# Patient Record
Sex: FEMALE | Race: WHITE | NOT HISPANIC OR LATINO | Employment: UNEMPLOYED | RURAL
[De-identification: names, ages, dates, MRNs, and addresses within clinical notes are randomized per-mention and may not be internally consistent; named-entity substitution may affect disease eponyms.]

---

## 2019-03-18 ENCOUNTER — HISTORICAL (OUTPATIENT)
Dept: ADMINISTRATIVE | Facility: HOSPITAL | Age: 64
End: 2019-03-18

## 2019-03-20 LAB
LAB AP GENERAL CAT - HISTORICAL: NORMAL
LAB AP INTERPRETATION/RESULT - HISTORICAL: NEGATIVE
LAB AP SPECIMEN ADEQUACY - HISTORICAL: NORMAL
LAB AP SPECIMEN SUBMITTED - HISTORICAL: NORMAL

## 2020-08-06 ENCOUNTER — HISTORICAL (OUTPATIENT)
Dept: ADMINISTRATIVE | Facility: HOSPITAL | Age: 65
End: 2020-08-06

## 2020-08-24 ENCOUNTER — HISTORICAL (OUTPATIENT)
Dept: ADMINISTRATIVE | Facility: HOSPITAL | Age: 65
End: 2020-08-24

## 2021-01-25 ENCOUNTER — HISTORICAL (OUTPATIENT)
Dept: ADMINISTRATIVE | Facility: HOSPITAL | Age: 66
End: 2021-01-25

## 2021-01-25 LAB
ALBUMIN SERPL BCP-MCNC: 3.9 G/DL (ref 3.5–5)
ALBUMIN/GLOB SERPL: 1 {RATIO}
ALP SERPL-CCNC: 88 U/L (ref 55–142)
ALT SERPL W P-5'-P-CCNC: 122 U/L (ref 13–56)
ANION GAP SERPL CALCULATED.3IONS-SCNC: 15 MMOL/L
AST SERPL W P-5'-P-CCNC: 50 U/L (ref 15–37)
BASOPHILS # BLD AUTO: 0.05 X10E3/UL (ref 0–0.2)
BASOPHILS NFR BLD AUTO: 0.6 % (ref 0–1)
BILIRUB SERPL-MCNC: 0.1 MG/DL (ref 0–1.2)
BUN SERPL-MCNC: 21 MG/DL (ref 7–18)
BUN/CREAT SERPL: 27.3
CALCIUM SERPL-MCNC: 9.1 MG/DL (ref 8.5–10.1)
CHLORIDE SERPL-SCNC: 102 MMOL/L (ref 98–107)
CO2 SERPL-SCNC: 26 MMOL/L (ref 21–32)
CREAT SERPL-MCNC: 0.77 MG/DL (ref 0.55–1.02)
EOSINOPHIL # BLD AUTO: 0.3 X10E3/UL (ref 0–0.5)
EOSINOPHIL NFR BLD AUTO: 3.3 % (ref 1–4)
ERYTHROCYTE [DISTWIDTH] IN BLOOD BY AUTOMATED COUNT: 14 % (ref 11.5–14.5)
GLOBULIN SER-MCNC: 4.1 G/DL (ref 2–4)
GLUCOSE SERPL-MCNC: 102 MG/DL (ref 74–106)
HCT VFR BLD AUTO: 38.1 % (ref 38–47)
HGB BLD-MCNC: 12.7 G/DL (ref 12–16)
LYMPHOCYTES # BLD AUTO: 2.13 X10E3/UL (ref 1–4.8)
LYMPHOCYTES NFR BLD AUTO: 23.5 % (ref 27–41)
MAGNESIUM SERPL-MCNC: 2.1 MG/DL (ref 1.7–2.3)
MCH RBC QN AUTO: 28.5 PG (ref 27–31)
MCHC RBC AUTO-ENTMCNC: 33.3 G/DL (ref 32–36)
MCV RBC AUTO: 86 FL (ref 80–96)
MONOCYTES # BLD AUTO: 0.61 X10E3/UL (ref 0–0.8)
MONOCYTES NFR BLD AUTO: 6.7 % (ref 2–6)
MPC BLD CALC-MCNC: 10 FL (ref 9.4–12.4)
NEUTROPHILS # BLD AUTO: 5.98 X10E3/UL (ref 1.8–7.7)
NEUTROPHILS NFR BLD AUTO: 65.9 % (ref 53–65)
PLATELET # BLD AUTO: 301 X10E3/UL (ref 150–400)
POTASSIUM SERPL-SCNC: 4.2 MMOL/L (ref 3.5–5.1)
PROT SERPL-MCNC: 8 G/DL (ref 6.4–8.2)
RBC # BLD AUTO: 4.45 X10E6/UL (ref 4.2–5.4)
SODIUM SERPL-SCNC: 139 MMOL/L (ref 136–145)
TROPONIN I SERPL-MCNC: <0.017 NG/ML (ref 0–0.06)
WBC # BLD AUTO: 9.07 X10E3/UL (ref 4.5–11)

## 2021-05-24 LAB — CRC RECOMMENDATION EXT: NORMAL

## 2022-11-23 ENCOUNTER — HOSPITAL ENCOUNTER (EMERGENCY)
Facility: HOSPITAL | Age: 67
Discharge: HOME | End: 2022-11-23
Payer: MEDICARE

## 2022-11-23 ENCOUNTER — TELEPHONE (OUTPATIENT)
Dept: EMERGENCY MEDICINE | Facility: HOSPITAL | Age: 67
End: 2022-11-23
Payer: MEDICARE

## 2022-11-23 VITALS
SYSTOLIC BLOOD PRESSURE: 128 MMHG | RESPIRATION RATE: 18 BRPM | BODY MASS INDEX: 30.73 KG/M2 | HEIGHT: 64 IN | DIASTOLIC BLOOD PRESSURE: 70 MMHG | HEART RATE: 72 BPM | WEIGHT: 180 LBS | TEMPERATURE: 98 F | OXYGEN SATURATION: 97 %

## 2022-11-23 DIAGNOSIS — R07.89 CHEST WALL PAIN: Primary | ICD-10-CM

## 2022-11-23 DIAGNOSIS — R07.9 CHEST PAIN: ICD-10-CM

## 2022-11-23 LAB
ALBUMIN SERPL BCP-MCNC: 4 G/DL (ref 3.5–5)
ALBUMIN/GLOB SERPL: 1.1 {RATIO}
ALP SERPL-CCNC: 113 U/L (ref 55–142)
ALT SERPL W P-5'-P-CCNC: 63 U/L (ref 13–56)
ANION GAP SERPL CALCULATED.3IONS-SCNC: 11 MMOL/L (ref 7–16)
AST SERPL W P-5'-P-CCNC: 28 U/L (ref 15–37)
BASOPHILS # BLD AUTO: 0.02 K/UL (ref 0–0.2)
BASOPHILS NFR BLD AUTO: 0.2 % (ref 0–1)
BILIRUB SERPL-MCNC: 0.5 MG/DL (ref ?–1.2)
BUN SERPL-MCNC: 22 MG/DL (ref 7–18)
BUN/CREAT SERPL: 30 (ref 6–20)
CALCIUM SERPL-MCNC: 9.2 MG/DL (ref 8.5–10.1)
CHLORIDE SERPL-SCNC: 106 MMOL/L (ref 98–107)
CO2 SERPL-SCNC: 28 MMOL/L (ref 21–32)
CREAT SERPL-MCNC: 0.74 MG/DL (ref 0.55–1.02)
DIFFERENTIAL METHOD BLD: ABNORMAL
EGFR (NO RACE VARIABLE) (RUSH/TITUS): 88 ML/MIN/1.73M²
EOSINOPHIL # BLD AUTO: 0.45 K/UL (ref 0–0.5)
EOSINOPHIL NFR BLD AUTO: 3.8 % (ref 1–4)
ERYTHROCYTE [DISTWIDTH] IN BLOOD BY AUTOMATED COUNT: 13.1 % (ref 11.5–14.5)
GLOBULIN SER-MCNC: 3.8 G/DL (ref 2–4)
GLUCOSE SERPL-MCNC: 112 MG/DL (ref 74–106)
HCT VFR BLD AUTO: 39.3 % (ref 38–47)
HGB BLD-MCNC: 13 G/DL (ref 12–16)
LYMPHOCYTES # BLD AUTO: 3.6 K/UL (ref 1–4.8)
LYMPHOCYTES NFR BLD AUTO: 30.3 % (ref 27–41)
MAGNESIUM SERPL-MCNC: 2 MG/DL (ref 1.7–2.3)
MCH RBC QN AUTO: 28.1 PG (ref 27–31)
MCHC RBC AUTO-ENTMCNC: 33.1 G/DL (ref 32–36)
MCV RBC AUTO: 84.9 FL (ref 80–96)
MONOCYTES # BLD AUTO: 0.83 K/UL (ref 0–0.8)
MONOCYTES NFR BLD AUTO: 7 % (ref 2–6)
MPC BLD CALC-MCNC: 9.9 FL (ref 9.4–12.4)
NEUTROPHILS # BLD AUTO: 6.98 K/UL (ref 1.8–7.7)
NEUTROPHILS NFR BLD AUTO: 58.7 % (ref 53–65)
PLATELET # BLD AUTO: 303 K/UL (ref 150–400)
POTASSIUM SERPL-SCNC: 4.3 MMOL/L (ref 3.5–5.1)
PROT SERPL-MCNC: 7.8 G/DL (ref 6.4–8.2)
RBC # BLD AUTO: 4.63 M/UL (ref 4.2–5.4)
SODIUM SERPL-SCNC: 141 MMOL/L (ref 136–145)
TROPONIN I SERPL HS-MCNC: 5.7 PG/ML
TROPONIN I SERPL HS-MCNC: 6.8 PG/ML
WBC # BLD AUTO: 11.88 K/UL (ref 4.5–11)

## 2022-11-23 PROCEDURE — 93005 ELECTROCARDIOGRAM TRACING: CPT

## 2022-11-23 PROCEDURE — 85025 COMPLETE CBC W/AUTO DIFF WBC: CPT | Performed by: NURSE PRACTITIONER

## 2022-11-23 PROCEDURE — 84484 ASSAY OF TROPONIN QUANT: CPT | Performed by: NURSE PRACTITIONER

## 2022-11-23 PROCEDURE — 80053 COMPREHEN METABOLIC PANEL: CPT | Performed by: NURSE PRACTITIONER

## 2022-11-23 PROCEDURE — 99283 EMERGENCY DEPT VISIT LOW MDM: CPT | Mod: GC | Performed by: NURSE PRACTITIONER

## 2022-11-23 PROCEDURE — 93010 ELECTROCARDIOGRAM REPORT: CPT | Performed by: FAMILY MEDICINE

## 2022-11-23 PROCEDURE — 25000003 PHARM REV CODE 250: Performed by: NURSE PRACTITIONER

## 2022-11-23 PROCEDURE — 83735 ASSAY OF MAGNESIUM: CPT | Performed by: NURSE PRACTITIONER

## 2022-11-23 PROCEDURE — 25000242 PHARM REV CODE 250 ALT 637 W/ HCPCS: Performed by: NURSE PRACTITIONER

## 2022-11-23 PROCEDURE — 36415 COLL VENOUS BLD VENIPUNCTURE: CPT | Performed by: NURSE PRACTITIONER

## 2022-11-23 PROCEDURE — 99285 EMERGENCY DEPT VISIT HI MDM: CPT | Mod: 25

## 2022-11-23 RX ORDER — NITROGLYCERIN 0.4 MG/1
0.4 TABLET SUBLINGUAL
Status: COMPLETED | OUTPATIENT
Start: 2022-11-23 | End: 2022-11-23

## 2022-11-23 RX ORDER — TIZANIDINE 4 MG/1
4 TABLET ORAL EVERY 6 HOURS PRN
Qty: 30 TABLET | Refills: 0 | Status: SHIPPED | OUTPATIENT
Start: 2022-11-23 | End: 2022-12-23

## 2022-11-23 RX ORDER — ASPIRIN 325 MG
325 TABLET ORAL
Status: COMPLETED | OUTPATIENT
Start: 2022-11-23 | End: 2022-11-23

## 2022-11-23 RX ORDER — NAPROXEN 500 MG/1
500 TABLET ORAL 2 TIMES DAILY WITH MEALS
Qty: 60 TABLET | Refills: 0 | Status: SHIPPED | OUTPATIENT
Start: 2022-11-23 | End: 2023-02-16

## 2022-11-23 RX ORDER — ESCITALOPRAM OXALATE 10 MG/1
10 TABLET ORAL DAILY
COMMUNITY
End: 2023-10-11

## 2022-11-23 RX ADMIN — ASPIRIN 325 MG ORAL TABLET 325 MG: 325 PILL ORAL at 03:11

## 2022-11-23 RX ADMIN — NITROGLYCERIN 0.4 MG: 0.4 TABLET SUBLINGUAL at 03:11

## 2022-11-23 NOTE — ED PROVIDER NOTES
Encounter Date: 11/23/2022       History     Chief Complaint   Patient presents with    Chest Pain     C/o chest pain and neck pain for aprox 1 hour     Patient presents with left-sided chest pain about an hour prior to arrival.  Was awoken from sleep with pain she describes as tightness.  Also reports having right posterior neck pain times several days.  Neck pain is not worse with movement.  Chest pain is nonradiating and waxing and waning.  At the time of exam she had no pain, then throughout the exam she began to report the same pain described as a tightness.  Nonradiating.  Denies dyspnea, palpitations, diaphoresis, or nausea.  No history of similar.  No known history of cardiac disease and has never seen Cardiology.  Mother had stents in her 60s.  The patient also reports multiple life stressors including death of a family member and attributes her pain to anxiety.    Review of patient's allergies indicates:  No Known Allergies  History reviewed. No pertinent past medical history.  Past Surgical History:   Procedure Laterality Date    CHOLECYSTECTOMY      HERNIA REPAIR      HYSTERECTOMY       History reviewed. No pertinent family history.  Social History     Tobacco Use    Smoking status: Former     Types: Cigarettes    Smokeless tobacco: Never   Substance Use Topics    Alcohol use: Not Currently    Drug use: Not Currently     Review of Systems   Constitutional:  Negative for fever.   HENT:  Negative for trouble swallowing.    Respiratory:  Positive for cough. Negative for shortness of breath.    Cardiovascular:  Positive for chest pain. Negative for palpitations and leg swelling.   Gastrointestinal:  Negative for abdominal pain, diarrhea, nausea and vomiting.   Genitourinary:  Negative for decreased urine volume.   Skin:  Negative for color change.   Neurological:  Negative for dizziness, numbness and headaches.     Physical Exam     Initial Vitals [11/23/22 0248]   BP Pulse Resp Temp SpO2   (!) 167/69 84 18  97.7 °F (36.5 °C) 97 %      MAP       --         Physical Exam    Nursing note and vitals reviewed.  Constitutional: No distress.   HENT:   Head: Normocephalic and atraumatic.   Eyes: EOM are normal.   Neck: Neck supple.   Cardiovascular:  Normal rate, regular rhythm and normal heart sounds.           Pulmonary/Chest: Breath sounds normal. No respiratory distress. She exhibits no tenderness.   Musculoskeletal:         General: No edema.      Cervical back: Neck supple.     Neurological: She is alert. GCS score is 15. GCS eye subscore is 4. GCS verbal subscore is 5. GCS motor subscore is 6.   Skin: Skin is warm and dry. Capillary refill takes less than 2 seconds.       Medical Screening Exam   See Full Note    ED Course   Procedures  Labs Reviewed   COMPREHENSIVE METABOLIC PANEL - Abnormal; Notable for the following components:       Result Value    Glucose 112 (*)     BUN 22 (*)     BUN/Creatinine Ratio 30 (*)     ALT 63 (*)     All other components within normal limits   CBC WITH DIFFERENTIAL - Abnormal; Notable for the following components:    WBC 11.88 (*)     Monocytes % 7.0 (*)     Monocytes, Absolute 0.83 (*)     All other components within normal limits   MAGNESIUM - Normal   TROPONIN I - Normal   TROPONIN I - Normal   CBC W/ AUTO DIFFERENTIAL    Narrative:     The following orders were created for panel order CBC auto differential.  Procedure                               Abnormality         Status                     ---------                               -----------         ------                     CBC with Differential[[id number]]        Abnormal            Final result                 Please view results for these tests on the individual orders.        ECG Results              EKG 12-lead (In process)  Result time 11/23/22 03:13:35      In process by Interface, Lab In [facility] (11/23/22 03:13:35)                   Narrative:    Test Reason : R07.9,    Vent. Rate : 080 BPM     Atrial Rate : 080 BPM      P-R Int : 142 ms          QRS Dur : 078 ms      QT Int : 396 ms       P-R-T Axes : 000 010 033 degrees     QTc Int : 456 ms    Sinus rhythm with Premature atrial complexes  Possible Inferior infarct ,age undetermined  Abnormal ECG  No previous ECGs available    Referred By:  ROSE           Confirmed By:                                   Imaging Results              X-Ray Chest 1 View (Final result)  Result time 11/23/22 07:50:21      Final result by Pablo Nix DO (11/23/22 07:50:21)                   Impression:      Lower lobe airspace opacities, atelectasis versus pneumonia. Background of chronic lung change.      Electronically signed by: Pablo Nix  Date:    11/23/2022  Time:    07:50               Narrative:    EXAMINATION:  XR CHEST 1 VIEW    CLINICAL HISTORY:  Chest pain, unspecified    TECHNIQUE:  XR CHEST 1 VIEW    COMPARISON:  2021    FINDINGS:  No lines or tubes.    Lower lobe airspace opacities, atelectasis versus pneumonia. Background of chronic lung change.    Normal pleura.    Cardiac silhouette is similar to comparison exam.    No obvious acute bone findings.                                       Medications   aspirin tablet 325 mg (325 mg Oral Given 11/23/22 0304)   nitroGLYCERIN SL tablet 0.4 mg (0.4 mg Sublingual Given 11/23/22 0316)        Additional MDM:   Heart Score:    History:          Moderately suspicious.  ECG:             Nonspecific repolarisation disturbance  Age:               >65 years  Risk factors: 1-2 risk factors  Troponin:       Less than or equal to normal limit  Final Score: 5          ED Course as of 11/27/22 0619   Wed Nov 23, 2022   0310 Question of ST elevation in inferior leads, however elevation is associated with PACs and not consistent throughout the leads. Requested Dr Soriano to look at EKG for second interpretation [GM]   0330 Negative initial troponin. [facility] faxed [GM]   2529 Video consult with Dr Ceron. Plan to repeat troponin and consult  cardiology []   0600 Signed out to Dr Grant []      ED Course User Index  [] GILMA Castro          Clinical Impression:   Final diagnoses:  [R07.9] Chest pain  [R07.89] Chest wall pain (Primary)        ED Disposition Condition    Discharge Stable          ED Prescriptions       Medication Sig Dispense Start Date End Date Auth. Provider    naproxen (NAPROSYN) 500 MG tablet Take 1 tablet (500 mg total) by mouth 2 (two) times daily with meals. 60 tablet 11/23/2022 -- Todd Grant DO    tiZANidine (ZANAFLEX) 4 MG tablet Take 1 tablet (4 mg total) by mouth every 6 (six) hours as needed. 30 tablet 11/23/2022 12/23/2022 Todd Grant DO          Follow-up Information    None          GILMA Castro  11/27/22 0619

## 2023-01-19 ENCOUNTER — ATHLETIC TRAINING SESSION (OUTPATIENT)
Dept: SPORTS MEDICINE | Facility: CLINIC | Age: 68
End: 2023-01-19

## 2023-01-19 NOTE — PROGRESS NOTES
Subjective:          Chief Complaint: Pau Odell is a 67 y.o. female student at [employer] who had no chief complaint listed for this encounter.    HPI    ROS                Objective:        General: Pau is well-developed, well-nourished, appears stated age, in no acute distress, alert and oriented to time, place and person.               Right Knee Exam     Comments:  Referred to Dr. Jareth Pascal III    Left Knee Exam   Left knee exam is normal.            Assessment:                 Plan:         1. ARICE  2. Physician Referral: yes  3. ED Referral: no  4. Parent/Guardian Notified5. All questions were answered, ath. will contact me for questions or concerns in  the interim.  6.         Eligible to use School Insurance:

## 2023-01-25 ENCOUNTER — ATHLETIC TRAINING SESSION (OUTPATIENT)
Dept: SPORTS MEDICINE | Facility: CLINIC | Age: 68
End: 2023-01-25

## 2023-01-25 NOTE — PROGRESS NOTES
Subjective:          Chief Complaint: Pau Odell is a 67 y.o. female student at [employer] who had no chief complaint listed for this encounter.    R Knee Pain        ROS                Objective:        General: Pau is well-developed, well-nourished, appears stated age, in no acute distress, alert and oriented to time, place and person.               Right Knee Exam     Inspection   Swelling: present  Effusion: present    Comments:  R Knee Pain  Referred to Dr Jareth Pascal    Left Knee Exam   Left knee exam is normal.            Assessment:                 Plan:         1. RICE  2. Physician Referral: yes  3. ED Referral: . Parent/Guardian Notified:   5. All questions were answered, ath. will contact me for questions or concerns in  the interim.  6.         Eligible to use School Insurance:

## 2023-01-26 ENCOUNTER — OFFICE VISIT (OUTPATIENT)
Dept: ORTHOPEDICS | Facility: CLINIC | Age: 68
End: 2023-01-26
Payer: MEDICARE

## 2023-01-26 ENCOUNTER — HOSPITAL ENCOUNTER (OUTPATIENT)
Dept: RADIOLOGY | Facility: HOSPITAL | Age: 68
Discharge: HOME | End: 2023-01-26
Attending: ORTHOPAEDIC SURGERY
Payer: MEDICARE

## 2023-01-26 VITALS — WEIGHT: 185 LBS | HEIGHT: 66 IN | BODY MASS INDEX: 29.73 KG/M2

## 2023-01-26 DIAGNOSIS — M25.561 ACUTE PAIN OF RIGHT KNEE: ICD-10-CM

## 2023-01-26 DIAGNOSIS — M25.561 ACUTE PAIN OF RIGHT KNEE: Primary | ICD-10-CM

## 2023-01-26 PROCEDURE — 73564 X-RAY EXAM KNEE 4 OR MORE: CPT | Mod: 26,RT,, | Performed by: ORTHOPAEDIC SURGERY

## 2023-01-26 PROCEDURE — 99213 OFFICE O/P EST LOW 20 MIN: CPT | Mod: PBBFAC | Performed by: ORTHOPAEDIC SURGERY

## 2023-01-26 PROCEDURE — 99203 OFFICE O/P NEW LOW 30 MIN: CPT | Mod: S$PBB,,, | Performed by: ORTHOPAEDIC SURGERY

## 2023-01-26 PROCEDURE — 73564 XR KNEE COMP 4 OR MORE VIEWS RIGHT: ICD-10-PCS | Mod: 26,RT,, | Performed by: ORTHOPAEDIC SURGERY

## 2023-01-26 PROCEDURE — 99203 PR OFFICE/OUTPT VISIT, NEW, LEVL III, 30-44 MIN: ICD-10-PCS | Mod: S$PBB,,, | Performed by: ORTHOPAEDIC SURGERY

## 2023-01-26 PROCEDURE — 73564 X-RAY EXAM KNEE 4 OR MORE: CPT | Mod: TC,RT

## 2023-01-26 NOTE — PROGRESS NOTES
CC:   Chief Complaint   Patient presents with    Right Knee - Pain     WILL BALL        PREVIOUS INFO:        HISTORY:   1/26/2023    Pau Odell  is a 67 y.o. got clipped by 110 lb dog right knee about 2  and half weeks ago had pain some swelling but still having significant problems trying to put weight through her leg she is been seen by nurse practitioner was started on Naprosyn and a Voltaren gel      PAST MEDICAL HISTORY: History reviewed. No pertinent past medical history.       PAST SURGICAL HISTORY:   Past Surgical History:   Procedure Laterality Date    CHOLECYSTECTOMY      HERNIA REPAIR      HYSTERECTOMY            ALLERGIES: Review of patient's allergies indicates:  No Known Allergies     MEDICATIONS :    Current Outpatient Medications:     EScitalopram oxalate (LEXAPRO) 10 MG tablet, Take 10 mg by mouth once daily., Disp: , Rfl:     naproxen (NAPROSYN) 500 MG tablet, Take 1 tablet (500 mg total) by mouth 2 (two) times daily with meals., Disp: 60 tablet, Rfl: 0     SOCIAL HISTORY:   Social History     Socioeconomic History    Marital status:    Tobacco Use    Smoking status: Former     Types: Cigarettes    Smokeless tobacco: Never   Substance and Sexual Activity    Alcohol use: Not Currently    Drug use: Not Currently    Sexual activity: Yes        ROS    FAMILY HISTORY: History reviewed. No pertinent family history.       PHYSICAL EXAM: There were no vitals filed for this visit.            Body mass index is 29.86 kg/m².     In general, this is a well-developed, well-nourished female . The patient is alert, oriented and cooperative.      HEENT:  Normocephalic, atraumatic.  Extraocular movements are intact bilaterally.  The oropharynx is benign.       NECK:  Nontender with good range of motion.      PULMONARY: Respirations are even and non-labored.       CARDIOVASCULAR: Pulses regular by peripheral palpation.       ABDOMEN:  Soft, non-tender, non-distended.        EXTREMITIES:   Pleasant white female  Right knee 2+ dorsalis pedis pulse minimal effusion lacks maybe 3-5 degrees full extension flexion is about 120 she is not that tender to the touch medially but valgus stressing causes pain medially Ivette testing causes pain medially anterior-posterior drawer appear to be stable    Ortho Exam      RADIOGRAPHIC FINDINGS:  Right knee standing x-rays total four views AP lateral sunrise weight-bearing films early osteophytes seen medially and laterally mild to moderate DJD no fracture dislocations appreciated      .      IMPRESSION: Acute pain of right knee     Status post clipping injury by 110 lb dog about 2 and half weeks ago still has difficulty weight-bearing    PLAN:  MRI of right knee  Knee immobilizer  Prescription for a walker          No follow-ups on file.         Cali Pascal III      (Subject to voice recognition error, transcription service not allowed)

## 2023-02-16 ENCOUNTER — OFFICE VISIT (OUTPATIENT)
Dept: ORTHOPEDICS | Facility: CLINIC | Age: 68
End: 2023-02-16
Payer: MEDICARE

## 2023-02-16 DIAGNOSIS — M17.10 PRIMARY OSTEOARTHRITIS OF KNEE, UNSPECIFIED LATERALITY: Primary | ICD-10-CM

## 2023-02-16 PROCEDURE — 99213 PR OFFICE/OUTPT VISIT, EST, LEVL III, 20-29 MIN: ICD-10-PCS | Mod: S$PBB,,, | Performed by: ORTHOPAEDIC SURGERY

## 2023-02-16 PROCEDURE — 99213 OFFICE O/P EST LOW 20 MIN: CPT | Mod: S$PBB,,, | Performed by: ORTHOPAEDIC SURGERY

## 2023-02-16 PROCEDURE — 99212 OFFICE O/P EST SF 10 MIN: CPT | Mod: PBBFAC | Performed by: ORTHOPAEDIC SURGERY

## 2023-02-16 RX ORDER — MELOXICAM 7.5 MG/1
7.5 TABLET ORAL DAILY
Qty: 30 TABLET | Refills: 1 | Status: SHIPPED | OUTPATIENT
Start: 2023-02-16 | End: 2023-04-06 | Stop reason: ALTCHOICE

## 2023-02-16 NOTE — PROGRESS NOTES
CC:   Chief Complaint   Patient presents with    Follow-up     RECHECK RT KNEE AFTER MRI        PREVIOUS INFO:  HISTORY:   1/26/2023    Pau Odell  is a 67 y.o. got clipped by 110 lb dog right knee about 2  and half weeks ago had pain some swelling but still having significant problems trying to put weight through her leg she is been seen by nurse practitioner was started on Naprosyn and a Voltaren gel      HISTORY:   2/16/2023    Pau Odell  is a 67 y.o. she really has not been using the Voltaren gel or the Naprosyn her knee still bothers her some she is been in a knee immobilizer after the MRI I discussed the MRI with her at length today      PAST MEDICAL HISTORY: No past medical history on file.       PAST SURGICAL HISTORY:   Past Surgical History:   Procedure Laterality Date    CHOLECYSTECTOMY      HERNIA REPAIR      HYSTERECTOMY            ALLERGIES: Review of patient's allergies indicates:  No Known Allergies     MEDICATIONS :    Current Outpatient Medications:     EScitalopram oxalate (LEXAPRO) 10 MG tablet, Take 10 mg by mouth once daily., Disp: , Rfl:     naproxen (NAPROSYN) 500 MG tablet, Take 1 tablet (500 mg total) by mouth 2 (two) times daily with meals., Disp: 60 tablet, Rfl: 0     SOCIAL HISTORY:   Social History     Socioeconomic History    Marital status:    Tobacco Use    Smoking status: Former     Types: Cigarettes    Smokeless tobacco: Never   Substance and Sexual Activity    Alcohol use: Not Currently    Drug use: Not Currently    Sexual activity: Yes        ROS    FAMILY HISTORY: No family history on file.       PHYSICAL EXAM: There were no vitals filed for this visit.            There is no height or weight on file to calculate BMI.     In general, this is a well-developed, well-nourished female . The patient is alert, oriented and cooperative.      HEENT:  Normocephalic, atraumatic.  Extraocular movements are intact bilaterally.  The oropharynx is benign.        NECK:  Nontender with good range of motion.      PULMONARY: Respirations are even and non-labored.       CARDIOVASCULAR: Pulses regular by peripheral palpation.       ABDOMEN:  Soft, non-tender, non-distended.        EXTREMITIES:  Valgus stressing causes some pain but she does not open up Ivette testing causes some pain also with flexion this is her right knee    Ortho Exam        RADIOGRAPHIC FINDINGS:  Right knee January 16, 2023 standing x-rays total four views AP lateral sunrise weight-bearing films early osteophytes seen medially and laterally mild to moderate DJD no fracture dislocations appreciated       MRI Knee Without Contrast Right: Result Notes   Cali Pascal III, MD   1/26/2023  1:20 PM CST       Acute MCL injury  Use the knee immobilizer and walker   Come out for some gentle range of motion  Follow-up 2-3 weeks     Does show cartilage wear worse than seen on x-ray       2Impression:     There is moderate to high-grade tearing involving the distal aspect of the MCL.     There is some subtle irregularity along the free edge of the posterior horn of the medial meniscus which may reflect tearing.     High-grade cartilage loss within the medial compartment.  Moderate cartilage loss within the lateral compartment.  High-grade cartilage loss involving the lateral aspect of the patellofemoral compartment.  Mild-to-moderate tricompartmental marginal osteophyte formation.     Small knee joint effusion.  Small Baker's cyst.     Point of Service: [facility]        Electronically signed by: Jorge Alex  Date:                                            01/26/2023  Time:                           .      IMPRESSION:  Acute MCL injury with a flare-up of underlying DJD that is worse on MRI than x-rays    PLAN:  Currently on will advance her to a hinged knee sleeve prescription written and get her on a prescription anti-inflammatory Mobic has as a discussed with her that I think the only surgical  option here would be a total knee replacement which as of right now she is interested in check on her in 3-4 weeks if the Mobic bothers her stomach stop it immediately        No follow-ups on file.         Cali Pascal III      (Subject to voice recognition error, transcription service not allowed)

## 2023-03-16 ENCOUNTER — OFFICE VISIT (OUTPATIENT)
Dept: ORTHOPEDICS | Facility: CLINIC | Age: 68
End: 2023-03-16
Payer: MEDICARE

## 2023-03-16 DIAGNOSIS — Z09 FOLLOW-UP EXAMINATION, FOLLOWING OTHER SURGERY: Primary | ICD-10-CM

## 2023-03-16 PROCEDURE — 20610 PR DRAIN/INJECT LARGE JOINT/BURSA: ICD-10-PCS | Mod: S$PBB,RT,, | Performed by: ORTHOPAEDIC SURGERY

## 2023-03-16 PROCEDURE — 99212 OFFICE O/P EST SF 10 MIN: CPT | Mod: PBBFAC | Performed by: ORTHOPAEDIC SURGERY

## 2023-03-16 PROCEDURE — 20610 DRAIN/INJ JOINT/BURSA W/O US: CPT | Mod: S$PBB,RT,, | Performed by: ORTHOPAEDIC SURGERY

## 2023-03-16 PROCEDURE — 20610 DRAIN/INJ JOINT/BURSA W/O US: CPT | Mod: PBBFAC | Performed by: ORTHOPAEDIC SURGERY

## 2023-03-16 PROCEDURE — 99212 OFFICE O/P EST SF 10 MIN: CPT | Mod: S$PBB,25,, | Performed by: ORTHOPAEDIC SURGERY

## 2023-03-16 PROCEDURE — 99212 PR OFFICE/OUTPT VISIT, EST, LEVL II, 10-19 MIN: ICD-10-PCS | Mod: S$PBB,25,, | Performed by: ORTHOPAEDIC SURGERY

## 2023-03-16 RX ORDER — BUPIVACAINE HYDROCHLORIDE 2.5 MG/ML
1 INJECTION, SOLUTION INFILTRATION; PERINEURAL
Status: COMPLETED | OUTPATIENT
Start: 2023-03-16 | End: 2023-03-16

## 2023-03-16 RX ORDER — TRIAMCINOLONE ACETONIDE 40 MG/ML
40 INJECTION, SUSPENSION INTRA-ARTICULAR; INTRAMUSCULAR
Status: COMPLETED | OUTPATIENT
Start: 2023-03-16 | End: 2023-03-16

## 2023-03-16 RX ADMIN — TRIAMCINOLONE ACETONIDE 40 MG: 400 INJECTION, SUSPENSION INTRA-ARTICULAR; INTRAMUSCULAR at 02:03

## 2023-03-16 RX ADMIN — BUPIVACAINE HYDROCHLORIDE 2.5 MG: 2.5 INJECTION, SOLUTION INFILTRATION; PERINEURAL at 02:03

## 2023-03-16 NOTE — PROGRESS NOTES
CC:   Chief Complaint   Patient presents with    Follow-up     RECHECK RT KNEE INJURT AFTER MOBIC        PREVIOUS INFO:  PREVIOUS INFO:  HISTORY:   1/26/2023    Pau Odell  is a 67 y.o. got clipped by 110 lb dog right knee about 2  and half weeks ago had pain some swelling but still having significant problems trying to put weight through her leg she is been seen by nurse practitioner was started on Naprosyn and a Voltaren gel        HISTORY:   2/16/2023    Pau Odell  is a 67 y.o. she really has not been using the Voltaren gel or the Naprosyn her knee still bothers her some she is been in a knee immobilizer after the MRI I discussed the MRI with her at length today         HISTORY:   3/16/2023    Pau Odell  is a 67 y.o. last visit she was placed in a hinged knee brace and placed on Mobic.  Also told her the only surgical option would be total knee replacement based on the MRI of the x-rays do not look that bad  Just has not improved having pain at night stiffness on rising right carpal period time bothers her.      PAST MEDICAL HISTORY: No past medical history on file.       PAST SURGICAL HISTORY:   Past Surgical History:   Procedure Laterality Date    CHOLECYSTECTOMY      HERNIA REPAIR      HYSTERECTOMY            ALLERGIES: Review of patient's allergies indicates:  No Known Allergies     MEDICATIONS :    Current Outpatient Medications:     EScitalopram oxalate (LEXAPRO) 10 MG tablet, Take 10 mg by mouth once daily., Disp: , Rfl:     meloxicam (MOBIC) 7.5 MG tablet, Take 1 tablet (7.5 mg total) by mouth once daily., Disp: 30 tablet, Rfl: 1     SOCIAL HISTORY:   Social History     Socioeconomic History    Marital status:    Tobacco Use    Smoking status: Former     Types: Cigarettes    Smokeless tobacco: Never   Substance and Sexual Activity    Alcohol use: Not Currently    Drug use: Not Currently    Sexual activity: Yes        ROS    FAMILY HISTORY: No family history on file.        PHYSICAL EXAM: There were no vitals filed for this visit.            There is no height or weight on file to calculate BMI.     In general, this is a well-developed, well-nourished female . The patient is alert, oriented and cooperative.      HEENT:  Normocephalic, atraumatic.  Extraocular movements are intact bilaterally.  The oropharynx is benign.       NECK:  Nontender with good range of motion.      PULMONARY: Respirations are even and non-labored.       CARDIOVASCULAR: Pulses regular by peripheral palpation.       ABDOMEN:  Soft, non-tender, non-distended.        EXTREMITIES:  She still has medial joint line tenderness Ivette causes pain no effusion stable to varus and valgus does not cause pain    Ortho Exam      RADIOGRAPHIC FINDINGS:     MRI Knee Without Contrast Right: Result Notes   Cali Pascal III, MD   1/26/2023  1:20 PM CST        Acute MCL injury  Use the knee immobilizer and walker   Come out for some gentle range of motion  Follow-up 2-3 weeks     Does show cartilage wear worse than seen on x-ray         2Impression:     There is moderate to high-grade tearing involving the distal aspect of the MCL.     There is some subtle irregularity along the free edge of the posterior horn of the medial meniscus which may reflect tearing.     High-grade cartilage loss within the medial compartment.  Moderate cartilage loss within the lateral compartment.  High-grade cartilage loss involving the lateral aspect of the patellofemoral compartment.  Mild-to-moderate tricompartmental marginal osteophyte formation.     Small knee joint effusion.  Small Baker's cyst.     Point of Service: [facility]        Electronically signed by: Jorge Alex  Date:                                            01/26/2023  Time      .      IMPRESSION:  Long discussion had today about the MRI findings x-rays not being bad and this off starting from a twist or trauma she got hit by a dog about 2 months ago around  January 10th has not done well she is on Mobic    PLAN:  We will try an injection today prep of Betadine injected 1 cc Kenalog 2 of Marcaine (addendum right knee 03/28/2023)   discussed with her prior to the injection that would not recommend surgery 4 months after an injection                Cali Pascal III      (Subject to voice recognition error, transcription service not allowed)

## 2023-04-06 ENCOUNTER — OFFICE VISIT (OUTPATIENT)
Dept: ORTHOPEDICS | Facility: CLINIC | Age: 68
End: 2023-04-06
Payer: MEDICARE

## 2023-04-06 DIAGNOSIS — M17.11 PRIMARY OSTEOARTHRITIS OF RIGHT KNEE: Primary | ICD-10-CM

## 2023-04-06 PROCEDURE — 99212 PR OFFICE/OUTPT VISIT, EST, LEVL II, 10-19 MIN: ICD-10-PCS | Mod: S$PBB,,, | Performed by: ORTHOPAEDIC SURGERY

## 2023-04-06 PROCEDURE — 99212 OFFICE O/P EST SF 10 MIN: CPT | Mod: S$PBB,,, | Performed by: ORTHOPAEDIC SURGERY

## 2023-04-06 PROCEDURE — 99213 OFFICE O/P EST LOW 20 MIN: CPT | Mod: PBBFAC | Performed by: ORTHOPAEDIC SURGERY

## 2023-04-06 RX ORDER — CELECOXIB 200 MG/1
200 CAPSULE ORAL DAILY
Qty: 30 CAPSULE | Refills: 1 | Status: SHIPPED | OUTPATIENT
Start: 2023-04-06 | End: 2023-06-08 | Stop reason: SDUPTHER

## 2023-04-06 NOTE — PROGRESS NOTES
CC:   Chief Complaint   Patient presents with    Follow-up     RECHECK RT KNEE         PREVIOUS INFO:        HISTORY:   4/6/2023    Pau Odell  is a 67 y.o. follow-up injection of her right knee 03/16/2023 and she is on Mobic this is been going on since early January we saw on January 26, 2023 she got knocked down by a dog started this so she is a proximally 3 months out from her injury  The injection helped for about 2 weeks but coming back she does have a knee brace she is wearing that she is starting to wear that actually she tried had it on backwards we got on right she is on try to wear it and will try some formal physical therapy in addition and switching her from Mobic to Celebrex total not to take both she understands check on her in a month      PAST MEDICAL HISTORY: No past medical history on file.       PAST SURGICAL HISTORY:   Past Surgical History:   Procedure Laterality Date    CHOLECYSTECTOMY      HERNIA REPAIR      HYSTERECTOMY            ALLERGIES: Review of patient's allergies indicates:  No Known Allergies     MEDICATIONS :    Current Outpatient Medications:     EScitalopram oxalate (LEXAPRO) 10 MG tablet, Take 10 mg by mouth once daily., Disp: , Rfl:     meloxicam (MOBIC) 7.5 MG tablet, Take 1 tablet (7.5 mg total) by mouth once daily., Disp: 30 tablet, Rfl: 1     SOCIAL HISTORY:   Social History     Socioeconomic History    Marital status:    Tobacco Use    Smoking status: Former     Types: Cigarettes    Smokeless tobacco: Never   Substance and Sexual Activity    Alcohol use: Not Currently    Drug use: Not Currently    Sexual activity: Yes        ROS    FAMILY HISTORY: No family history on file.       PHYSICAL EXAM: There were no vitals filed for this visit.            There is no height or weight on file to calculate BMI.     In general, this is a well-developed, well-nourished female . The patient is alert, oriented and cooperative.      HEENT:  Normocephalic,  atraumatic.  Extraocular movements are intact bilaterally.  The oropharynx is benign.       NECK:  Nontender with good range of motion.      PULMONARY: Respirations are even and non-labored.       CARDIOVASCULAR: Pulses regular by peripheral palpation.       ABDOMEN:  Soft, non-tender, non-distended.        EXTREMITIES:  She is got some joint line tenderness medially and laterally she is stable anterior-posterior drawer stable to varus and valgus stressing today probable Saldana cyst in addition    Ortho Exam      RADIOGRAPHIC FINDINGS:     MRI Knee Without Contrast Right: Result Notes   Cali Pascal III, MD   1/26/2023  1:20 PM CST        Acute MCL injury  Use the knee immobilizer and walker   Come out for some gentle range of motion  Follow-up 2-3 weeks     Does show cartilage wear worse than seen on x-ray         2Impression:     There is moderate to high-grade tearing involving the distal aspect of the MCL.     There is some subtle irregularity along the free edge of the posterior horn of the medial meniscus which may reflect tearing.     High-grade cartilage loss within the medial compartment.  Moderate cartilage loss within the lateral compartment.  High-grade cartilage loss involving the lateral aspect of the patellofemoral compartment.  Mild-to-moderate tricompartmental marginal osteophyte formation.     Small knee joint effusion.  Small Baker's cyst.     Point of Service: [facility]        Electronically signed by: Jorge Alex  Date:                                            01/26/2023      .      IMPRESSION: Primary osteoarthritis of right knee     Has not responded to conservative care we are going to switch anti-inflammatories and try some formal physical therapy steroid injection helped for a couple weeks    PLAN:  Check on her in a month        No follow-ups on file.         Cali Pascal III      (Subject to voice recognition error, transcription service not allowed)

## 2023-06-09 RX ORDER — CELECOXIB 200 MG/1
200 CAPSULE ORAL DAILY
Qty: 30 CAPSULE | Refills: 1 | Status: SHIPPED | OUTPATIENT
Start: 2023-06-09 | End: 2023-06-13 | Stop reason: SDUPTHER

## 2023-06-13 RX ORDER — CELECOXIB 200 MG/1
200 CAPSULE ORAL DAILY
Qty: 30 CAPSULE | Refills: 1 | Status: SHIPPED | OUTPATIENT
Start: 2023-06-13

## 2023-10-11 ENCOUNTER — OFFICE VISIT (OUTPATIENT)
Dept: FAMILY MEDICINE | Facility: CLINIC | Age: 68
End: 2023-10-11
Payer: MEDICARE

## 2023-10-11 VITALS
OXYGEN SATURATION: 97 % | BODY MASS INDEX: 27.99 KG/M2 | TEMPERATURE: 98 F | HEIGHT: 66 IN | WEIGHT: 174.19 LBS | RESPIRATION RATE: 18 BRPM | HEART RATE: 86 BPM

## 2023-10-11 DIAGNOSIS — F41.9 ANXIETY: Primary | Chronic | ICD-10-CM

## 2023-10-11 DIAGNOSIS — Z23 IMMUNIZATION DUE: ICD-10-CM

## 2023-10-11 DIAGNOSIS — Z13.6 ENCOUNTER FOR SCREENING FOR CARDIOVASCULAR DISORDERS: ICD-10-CM

## 2023-10-11 DIAGNOSIS — Z13.220 SCREENING CHOLESTEROL LEVEL: ICD-10-CM

## 2023-10-11 DIAGNOSIS — M17.11 PRIMARY OSTEOARTHRITIS OF RIGHT KNEE: Chronic | ICD-10-CM

## 2023-10-11 DIAGNOSIS — D86.9 SARCOIDOSIS: ICD-10-CM

## 2023-10-11 DIAGNOSIS — K14.8 TONGUE LESION: ICD-10-CM

## 2023-10-11 DIAGNOSIS — Z13.1 SCREENING FOR DIABETES MELLITUS: ICD-10-CM

## 2023-10-11 DIAGNOSIS — R73.9 HYPERGLYCEMIA: ICD-10-CM

## 2023-10-11 DIAGNOSIS — Z78.0 ASYMPTOMATIC MENOPAUSAL STATE: ICD-10-CM

## 2023-10-11 DIAGNOSIS — L40.9 SCALP PSORIASIS: Chronic | ICD-10-CM

## 2023-10-11 DIAGNOSIS — R25.2 LEG CRAMPS: ICD-10-CM

## 2023-10-11 DIAGNOSIS — Z12.31 ENCOUNTER FOR SCREENING MAMMOGRAM FOR MALIGNANT NEOPLASM OF BREAST: ICD-10-CM

## 2023-10-11 DIAGNOSIS — R19.7 DIARRHEA, UNSPECIFIED TYPE: ICD-10-CM

## 2023-10-11 DIAGNOSIS — Z79.899 OTHER LONG TERM (CURRENT) DRUG THERAPY: ICD-10-CM

## 2023-10-11 DIAGNOSIS — Z13.820 SCREENING FOR OSTEOPOROSIS: ICD-10-CM

## 2023-10-11 DIAGNOSIS — K21.9 GASTROESOPHAGEAL REFLUX DISEASE WITHOUT ESOPHAGITIS: Chronic | ICD-10-CM

## 2023-10-11 PROBLEM — M25.561 ACUTE PAIN OF RIGHT KNEE: Status: RESOLVED | Noted: 2023-01-26 | Resolved: 2023-10-11

## 2023-10-11 PROCEDURE — G0009 PNEUMOCOCCAL CONJUGATE VACCINE 20-VALENT: ICD-10-PCS | Mod: ,,, | Performed by: FAMILY MEDICINE

## 2023-10-11 PROCEDURE — 83735 ASSAY OF MAGNESIUM: CPT | Mod: ,,, | Performed by: CLINICAL MEDICAL LABORATORY

## 2023-10-11 PROCEDURE — 84100 PHOSPHORUS: ICD-10-PCS | Mod: ,,, | Performed by: CLINICAL MEDICAL LABORATORY

## 2023-10-11 PROCEDURE — 80053 COMPREHENSIVE METABOLIC PANEL: ICD-10-PCS | Mod: ,,, | Performed by: CLINICAL MEDICAL LABORATORY

## 2023-10-11 PROCEDURE — 83036 HEMOGLOBIN A1C: ICD-10-PCS | Mod: ,,, | Performed by: CLINICAL MEDICAL LABORATORY

## 2023-10-11 PROCEDURE — 90694 FLU VACCINE - QUADRIVALENT - ADJUVANTED: ICD-10-PCS | Mod: ,,, | Performed by: FAMILY MEDICINE

## 2023-10-11 PROCEDURE — 80061 LIPID PANEL: CPT | Mod: ,,, | Performed by: CLINICAL MEDICAL LABORATORY

## 2023-10-11 PROCEDURE — 80053 COMPREHEN METABOLIC PANEL: CPT | Mod: ,,, | Performed by: CLINICAL MEDICAL LABORATORY

## 2023-10-11 PROCEDURE — 85025 COMPLETE CBC W/AUTO DIFF WBC: CPT | Mod: ,,, | Performed by: CLINICAL MEDICAL LABORATORY

## 2023-10-11 PROCEDURE — 82306 VITAMIN D: ICD-10-PCS | Mod: ,,, | Performed by: CLINICAL MEDICAL LABORATORY

## 2023-10-11 PROCEDURE — G0008 ADMIN INFLUENZA VIRUS VAC: HCPCS | Mod: ,,, | Performed by: FAMILY MEDICINE

## 2023-10-11 PROCEDURE — 82306 VITAMIN D 25 HYDROXY: CPT | Mod: ,,, | Performed by: CLINICAL MEDICAL LABORATORY

## 2023-10-11 PROCEDURE — G0008 FLU VACCINE - QUADRIVALENT - ADJUVANTED: ICD-10-PCS | Mod: ,,, | Performed by: FAMILY MEDICINE

## 2023-10-11 PROCEDURE — G0009 ADMIN PNEUMOCOCCAL VACCINE: HCPCS | Mod: ,,, | Performed by: FAMILY MEDICINE

## 2023-10-11 PROCEDURE — 90677 PCV20 VACCINE IM: CPT | Mod: ,,, | Performed by: FAMILY MEDICINE

## 2023-10-11 PROCEDURE — 80061 LIPID PANEL: ICD-10-PCS | Mod: ,,, | Performed by: CLINICAL MEDICAL LABORATORY

## 2023-10-11 PROCEDURE — 99204 OFFICE O/P NEW MOD 45 MIN: CPT | Mod: ,,, | Performed by: FAMILY MEDICINE

## 2023-10-11 PROCEDURE — 90677 PNEUMOCOCCAL CONJUGATE VACCINE 20-VALENT: ICD-10-PCS | Mod: ,,, | Performed by: FAMILY MEDICINE

## 2023-10-11 PROCEDURE — 99204 PR OFFICE/OUTPT VISIT, NEW, LEVL IV, 45-59 MIN: ICD-10-PCS | Mod: ,,, | Performed by: FAMILY MEDICINE

## 2023-10-11 PROCEDURE — 90694 VACC AIIV4 NO PRSRV 0.5ML IM: CPT | Mod: ,,, | Performed by: FAMILY MEDICINE

## 2023-10-11 PROCEDURE — 83036 HEMOGLOBIN GLYCOSYLATED A1C: CPT | Mod: ,,, | Performed by: CLINICAL MEDICAL LABORATORY

## 2023-10-11 PROCEDURE — 83735 MAGNESIUM: ICD-10-PCS | Mod: ,,, | Performed by: CLINICAL MEDICAL LABORATORY

## 2023-10-11 PROCEDURE — 84100 ASSAY OF PHOSPHORUS: CPT | Mod: ,,, | Performed by: CLINICAL MEDICAL LABORATORY

## 2023-10-11 PROCEDURE — 85025 CBC WITH DIFFERENTIAL: ICD-10-PCS | Mod: ,,, | Performed by: CLINICAL MEDICAL LABORATORY

## 2023-10-11 RX ORDER — CHOLESTYRAMINE 4 G/9G
4 POWDER, FOR SUSPENSION ORAL DAILY PRN
Qty: 60 PACKET | Refills: 1 | Status: SHIPPED | OUTPATIENT
Start: 2023-10-11 | End: 2024-10-10

## 2023-10-11 RX ORDER — HYDROXYZINE HYDROCHLORIDE 25 MG/1
1 TABLET, FILM COATED ORAL DAILY
COMMUNITY
Start: 2023-07-12 | End: 2023-10-11 | Stop reason: SDUPTHER

## 2023-10-11 RX ORDER — HYDROXYZINE HYDROCHLORIDE 25 MG/1
25 TABLET, FILM COATED ORAL DAILY
Qty: 90 TABLET | Refills: 1 | Status: SHIPPED | OUTPATIENT
Start: 2023-10-11

## 2023-10-11 RX ORDER — ESCITALOPRAM OXALATE 20 MG/1
1 TABLET ORAL EVERY MORNING
COMMUNITY

## 2023-10-11 NOTE — PROGRESS NOTES
Clinic Note    Patient Name: Pau Odell  : 1955  MRN: [id number]    Chief Complaint   Patient presents with    Establish Care     Pt states she wants to make wellness visit        HPI:    Ms. Pau Odell is a 68 y.o. female who presents to clinic today with CC of need to establish care and follow up on chronic disease processes including anxiety, sarcoidosis, GERD, scalp psoriasis, and chronic OA pain.   Patient reports lesion on her tongue that has been present for over a year. Reports she feels like it started when she bit her tongue. States it does not hurt and is not changing. Admits, however, she has not let anyone look at this and would like to get it checked out.   Reports she was diagnosed with sarcoidosis (previously followed with Dr. Ziegler - Pulmonology). Reports this was diagnosed off a CXR and lung biopsy. Reports this started from an abnormal CXR. Reports she is asymptomatic. Reports she previously followed annually with Pulmonology but stopped going because she is not symptomatic. She reports she does not desire to follow with someone new at this time. Reports her Pulmonologist told her he would be surprised if she ever had any symptoms or issues with this diagnosis.   Reports she does have some issues with her tear ducts which her eye doctor told her was related to sarcoidosis. States she gets injections for this because he told her she did not have tear ducts.   Reports she sees Dr. Valderarma (Dermatology) in [place] for thick plaques on skin. Reports she was told previously this could also be related to sarcoidosis.   Patient reports one episode of chest tightness 1.5 years ago. Reports normal work up in ER. Reports she never followed up with Cardiology as recommended. States she has had no further symptoms in 1.5 years. Denies interest in referral to cardiology for cardiac evaluation at this time.  Patient reports chronic issues are well controlled on current medication  regimen.  Denies problems or side effects with medications.  Patient is, otherwise, without complaints.     Medications:  Medication List with Changes/Refills   New Medications    CHOLESTYRAMINE (QUESTRAN) 4 GRAM PACKET    Take 1 packet (4 g total) by mouth daily as needed (diarrhea).   Current Medications    CELECOXIB (CELEBREX) 200 MG CAPSULE    Take 1 capsule (200 mg total) by mouth once daily.    DIPHENHYD/PHENYLEPH/ACETAMINOP (BENADRYL ALLERGY/COLD ORAL)    Take 1 tablet by mouth every evening.    ESCITALOPRAM OXALATE (LEXAPRO) 20 MG TABLET    Take 1 tablet by mouth every morning.    MECOBALAMIN (B12 ACTIVE ORAL)    Take 1 tablet by mouth once daily.    MELATONIN 10 MG CHEW    Take 1 tablet by mouth nightly.   Changed and/or Refilled Medications    Modified Medication Previous Medication    HYDROXYZINE HCL (ATARAX) 25 MG TABLET hydrOXYzine HCL (ATARAX) 25 MG tablet       Take 1 tablet (25 mg total) by mouth once daily.    Take 1 tablet by mouth once daily.   Discontinued Medications    ESCITALOPRAM OXALATE (LEXAPRO) 10 MG TABLET    Take 10 mg by mouth once daily.        Allergies: Patient has no known allergies.      Past Medical History:    History reviewed. No pertinent past medical history.    Past Surgical History:    Past Surgical History:   Procedure Laterality Date    CHOLECYSTECTOMY      HERNIA REPAIR      HYSTERECTOMY           Social History:    Social History     Tobacco Use   Smoking Status Former    Types: Cigarettes   Smokeless Tobacco Never     Social History     Substance and Sexual Activity   Alcohol Use Not Currently     Social History     Substance and Sexual Activity   Drug Use Not Currently         Family History:    History reviewed. No pertinent family history.    Review of Systems:    Review of Systems   Constitutional:  Negative for appetite change, chills, fatigue, fever and unexpected weight change.   HENT:          + lesion on tongue   Eyes:  Negative for visual disturbance.  "  Respiratory:  Negative for cough and shortness of breath.    Cardiovascular:  Negative for chest pain and leg swelling.   Gastrointestinal:  Negative for abdominal pain, change in bowel habit, constipation, diarrhea, nausea and vomiting.   Musculoskeletal:  Negative for arthralgias.   Integumentary:  Negative for rash.   Neurological:  Negative for dizziness and headaches.   Psychiatric/Behavioral:  The patient is not nervous/anxious.         Vitals:    Vitals:    10/11/23 1623   Pulse: 86   Resp: 18   Temp: 97.6 °F (36.4 °C)   TempSrc: Oral   SpO2: 97%   Weight: 79 kg (174 lb 3.2 oz)   Height: 5' 6" (1.676 m)       Body mass index is 28.12 kg/m².    Wt Readings from Last 3 Encounters:   10/11/23 1623 79 kg (174 lb 3.2 oz)   01/26/23 0908 83.9 kg (185 lb)   11/23/22 0248 81.6 kg (180 lb)        Physical Exam:    Physical Exam  Constitutional:       General: She is not in acute distress.     Appearance: Normal appearance.   HENT:      Nose: Nose normal.      Mouth/Throat:      Mouth: Mucous membranes are moist.      Pharynx: Oropharynx is clear.      Comments: + lesion to L side of tongue  Eyes:      Conjunctiva/sclera: Conjunctivae normal.   Cardiovascular:      Rate and Rhythm: Normal rate and regular rhythm.      Heart sounds: Normal heart sounds. No murmur heard.  Pulmonary:      Effort: Pulmonary effort is normal. No respiratory distress.      Breath sounds: Normal breath sounds. No wheezing, rhonchi or rales.   Abdominal:      General: Bowel sounds are normal.      Palpations: Abdomen is soft.      Tenderness: There is no abdominal tenderness.   Musculoskeletal:      Cervical back: Neck supple.      Right lower leg: No edema.      Left lower leg: No edema.   Skin:     Findings: No rash.   Neurological:      General: No focal deficit present.      Mental Status: She is alert. Mental status is at baseline.   Psychiatric:         Mood and Affect: Mood normal.           Assessment/Plan:   1. Anxiety  -     " hydrOXYzine HCL (ATARAX) 25 MG tablet; Take 1 tablet (25 mg total) by mouth once daily.  Dispense: 90 tablet; Refill: 1    2. Tongue lesion  -     Ambulatory referral/consult to ENT; Future; Expected date: 10/18/2023    3. Encounter for screening mammogram for malignant neoplasm of breast  -     Mammo Digital Screening Bilat; Future; Expected date: 10/11/2023    4. Scalp psoriasis  The current medical regimen is effective;  continue present plan and medications.    5. Sarcoidosis  -     CBC Auto Differential; Future; Expected date: 10/11/2023  -     Comprehensive Metabolic Panel; Future; Expected date: 10/11/2023  -     Lipid Panel; Future; Expected date: 10/11/2023  -     Hemoglobin A1C; Future; Expected date: 10/11/2023    6. Gastroesophageal reflux disease without esophagitis  The current medical regimen is effective;  continue present plan and medications.    7. Primary osteoarthritis of right knee  The current medical regimen is effective;  continue present plan and medications.    8. Screening cholesterol level  -     Lipid Panel; Future; Expected date: 10/11/2023    9. Leg cramps  -     CBC Auto Differential; Future; Expected date: 10/11/2023  -     Comprehensive Metabolic Panel; Future; Expected date: 10/11/2023  -     Vitamin D; Future; Expected date: 10/11/2023  -     Magnesium; Future; Expected date: 10/11/2023  -     Phosphorus; Future; Expected date: 10/11/2023    10. Other long term (current) drug therapy  -     Vitamin D; Future; Expected date: 10/11/2023  -     Magnesium; Future; Expected date: 10/11/2023  -     Phosphorus; Future; Expected date: 10/11/2023    11. Screening for diabetes mellitus  -     Hemoglobin A1C; Future; Expected date: 10/11/2023    12. Hyperglycemia  -     Lipid Panel; Future; Expected date: 10/11/2023  -     Hemoglobin A1C; Future; Expected date: 10/11/2023    13. Encounter for screening for cardiovascular disorders  -     Lipid Panel; Future; Expected date: 10/11/2023    14.  Immunization due  -     Influenza (FLUAD) - Quadrivalent (Adjuvanted) *Preferred* (65+) (PF)  -     (In Office Administered) Pneumococcal Conjugate Vaccine (20 Valent) (IM) (Preferred)    15. Screening for osteoporosis  -     DXA Bone Density Axial Skeleton 1 or more sites; Future; Expected date: 10/11/2023    16. Asymptomatic menopausal state  -     DXA Bone Density Axial Skeleton 1 or more sites; Future; Expected date: 10/11/2023    17. Diarrhea, unspecified type  -     cholestyramine (QUESTRAN) 4 gram packet; Take 1 packet (4 g total) by mouth daily as needed (diarrhea).  Dispense: 60 packet; Refill: 1         Active Problem List with Overview Notes    Diagnosis Date Noted    Primary osteoarthritis of right knee 04/06/2023    Lesion of tongue 03/24/2022    Anxiety 03/14/2022    Sarcoidosis 03/14/2022    GERD (gastroesophageal reflux disease) 08/03/2020    Scalp psoriasis 08/03/2020        Health Maintenance:  Health Maintenance   Topic Date Due    Hepatitis C Screening  Never done    Lipid Panel  Never done    TETANUS VACCINE  Never done    Mammogram  Never done    DEXA Scan  Never done    Shingles Vaccine (1 of 2) Never done    Colorectal Cancer Screening  12/31/2030   Dr. [facility] - colonoscopy    RTC in 6 months for chronic follow up.  RTC sooner if needed.   Patient voiced understanding and is agreeable to plan.      Kayce Jay MD    Family Medicine

## 2023-10-12 LAB
25(OH)D3 SERPL-MCNC: 7.4 NG/ML
ALBUMIN SERPL BCP-MCNC: 4.1 G/DL (ref 3.5–5)
ALBUMIN/GLOB SERPL: 1 {RATIO}
ALP SERPL-CCNC: 109 U/L (ref 55–142)
ALT SERPL W P-5'-P-CCNC: 38 U/L (ref 13–56)
ANION GAP SERPL CALCULATED.3IONS-SCNC: 13 MMOL/L (ref 7–16)
AST SERPL W P-5'-P-CCNC: 16 U/L (ref 15–37)
BASOPHILS # BLD AUTO: 0.07 K/UL (ref 0–0.2)
BASOPHILS NFR BLD AUTO: 0.5 % (ref 0–1)
BILIRUB SERPL-MCNC: 0.3 MG/DL (ref ?–1.2)
BUN SERPL-MCNC: 30 MG/DL (ref 7–18)
BUN/CREAT SERPL: 36 (ref 6–20)
CALCIUM SERPL-MCNC: 9.5 MG/DL (ref 8.5–10.1)
CHLORIDE SERPL-SCNC: 105 MMOL/L (ref 98–107)
CHOLEST SERPL-MCNC: 257 MG/DL (ref 0–200)
CHOLEST/HDLC SERPL: 2.6 {RATIO}
CO2 SERPL-SCNC: 24 MMOL/L (ref 21–32)
CREAT SERPL-MCNC: 0.83 MG/DL (ref 0.55–1.02)
DIFFERENTIAL METHOD BLD: ABNORMAL
EGFR (NO RACE VARIABLE) (RUSH/TITUS): 76 ML/MIN/1.73M2
EOSINOPHIL # BLD AUTO: 0.18 K/UL (ref 0–0.5)
EOSINOPHIL NFR BLD AUTO: 1.3 % (ref 1–4)
ERYTHROCYTE [DISTWIDTH] IN BLOOD BY AUTOMATED COUNT: 12.9 % (ref 11.5–14.5)
EST. AVERAGE GLUCOSE BLD GHB EST-MCNC: 114 MG/DL
GLOBULIN SER-MCNC: 4.1 G/DL (ref 2–4)
GLUCOSE SERPL-MCNC: 94 MG/DL (ref 74–106)
HBA1C MFR BLD HPLC: 6 % (ref 4.5–6.6)
HCT VFR BLD AUTO: 39 % (ref 38–47)
HDLC SERPL-MCNC: 97 MG/DL (ref 40–60)
HGB BLD-MCNC: 12.8 G/DL (ref 12–16)
IMM GRANULOCYTES # BLD AUTO: 0.07 K/UL (ref 0–0.04)
IMM GRANULOCYTES NFR BLD: 0.5 % (ref 0–0.4)
LDLC SERPL CALC-MCNC: 133 MG/DL
LDLC/HDLC SERPL: 1.4 {RATIO}
LYMPHOCYTES # BLD AUTO: 3.21 K/UL (ref 1–4.8)
LYMPHOCYTES NFR BLD AUTO: 24 % (ref 27–41)
MAGNESIUM SERPL-MCNC: 2.3 MG/DL (ref 1.7–2.3)
MCH RBC QN AUTO: 28 PG (ref 27–31)
MCHC RBC AUTO-ENTMCNC: 32.8 G/DL (ref 32–36)
MCV RBC AUTO: 85.3 FL (ref 80–96)
MONOCYTES # BLD AUTO: 0.81 K/UL (ref 0–0.8)
MONOCYTES NFR BLD AUTO: 6.1 % (ref 2–6)
MPC BLD CALC-MCNC: 10.7 FL (ref 9.4–12.4)
NEUTROPHILS # BLD AUTO: 9.01 K/UL (ref 1.8–7.7)
NEUTROPHILS NFR BLD AUTO: 67.6 % (ref 53–65)
NONHDLC SERPL-MCNC: 160 MG/DL
NRBC # BLD AUTO: 0 X10E3/UL
NRBC, AUTO (.00): 0 %
PHOSPHATE SERPL-MCNC: 4.2 MG/DL (ref 2.5–4.5)
PLATELET # BLD AUTO: 329 K/UL (ref 150–400)
POTASSIUM SERPL-SCNC: 4.5 MMOL/L (ref 3.5–5.1)
PROT SERPL-MCNC: 8.2 G/DL (ref 6.4–8.2)
RBC # BLD AUTO: 4.57 M/UL (ref 4.2–5.4)
SODIUM SERPL-SCNC: 137 MMOL/L (ref 136–145)
TRIGL SERPL-MCNC: 133 MG/DL (ref 35–150)
VLDLC SERPL-MCNC: 27 MG/DL
WBC # BLD AUTO: 13.35 K/UL (ref 4.5–11)

## 2023-10-16 ENCOUNTER — TELEPHONE (OUTPATIENT)
Dept: FAMILY MEDICINE | Facility: CLINIC | Age: 68
End: 2023-10-16
Payer: MEDICARE

## 2023-10-16 DIAGNOSIS — E78.00 ELEVATED CHOLESTEROL: Primary | ICD-10-CM

## 2023-10-16 RX ORDER — ERGOCALCIFEROL 1.25 MG/1
50000 CAPSULE ORAL
Qty: 12 CAPSULE | Refills: 0 | Status: SHIPPED | OUTPATIENT
Start: 2023-10-16

## 2023-10-16 NOTE — TELEPHONE ENCOUNTER
----- Message from Jordyn Jay MD sent at 10/16/2023 12:39 PM CDT -----  Please call patient regarding lab results. Cholesterol is mildly elevated. Recommend low cholesterol diet, exercise, and OTC burpless fish oil. Repeat fasting lipid panel in 6 months. Patient is not diabetic but HbA1C is in prediabetic range. Recommend low carb diet and exercise. Vitamin D is low. Recommend ergocalciferol 50,000 units weekly X 12 weeks. Then transition to OTC vitamin D supplementation 2,000-5,000 units daily.   Labs, otherwise, ok/stable. Thanks!    1717- call made to pt regarding above message; pt voiced understanding and verified pharmacy as [facility] in [facility].

## 2023-10-16 NOTE — TELEPHONE ENCOUNTER
----- Message from Jordyn Jay MD sent at 10/16/2023 12:39 PM CDT -----  Please call patient regarding lab results. Cholesterol is mildly elevated. Recommend low cholesterol diet, exercise, and OTC burpless fish oil. Repeat fasting lipid panel in 6 months. Patient is not diabetic but HbA1C is in prediabetic range. Recommend low carb diet and exercise. Vitamin D is low. Recommend ergocalciferol 50,000 units weekly X 12 weeks. Then transition to OTC vitamin D supplementation 2,000-5,000 units daily.   Labs, otherwise, ok/stable. Thanks!

## 2023-10-23 ENCOUNTER — PATIENT OUTREACH (OUTPATIENT)
Dept: ADMINISTRATIVE | Facility: HOSPITAL | Age: 68
End: 2023-10-23

## 2023-10-23 ENCOUNTER — PATIENT MESSAGE (OUTPATIENT)
Dept: ADMINISTRATIVE | Facility: HOSPITAL | Age: 68
End: 2023-10-23

## 2023-10-23 NOTE — LETTER
AUTHORIZATION FOR RELEASE OF   CONFIDENTIAL INFORMATION    Dear SADAF Lees,    We are seeing Pau Odell, date of birth 1955, in the clinic at [facility]. Jordyn Jay MD is the patient's PCP. Pau Odell has an outstanding lab/procedure at the time we reviewed her chart. In order to help keep her health information updated, she has authorized us to request the following medical record(s):        (  )  MAMMOGRAM                                      ( x )  COLONOSCOPY      (  )  PAP SMEAR                                          ( x )  OUTSIDE LAB RESULTS     (  )  DEXA SCAN                                          (  )  EYE EXAM            (  )  FOOT EXAM                                          (  )  ENTIRE RECORD     (  )  OUTSIDE IMMUNIZATIONS                 (  )  _______________         Please fax records to [facility], Hailey-Sharp, Anna-Marie, MD, [phone]     If you have any questions, please contact Alycia Almodovar Lpn-Clinic Care Coordinator at [phone].           Patient Name: Pau Odell  : 1955  Patient Phone #: [phone]

## 2023-10-23 NOTE — PROGRESS NOTES
Health maintenance record review for population health care gaps    Population Health Chart Review & Patient Outreach Details      Further Action Needed If Patient Returns Outreach:            Updates Requested / Reviewed:     [x]  Care Everywhere    [x]     []  External Sources ([facility], [facility], [facility], etc.)    [] [facility]   [] [facility]   [] Other:    [x]  Care Team Updated   []  Removed  or Duplicate Orders   [x]  Immunization Reconciliation Completed / Queried    [] [place]   [x] [place]   [] [place]   [] [place]      Health Maintenance Topics Addressed and Outreach Outcomes / Actions Taken:             Breast Cancer Screening [x]  Mammogram Order Placed    []  Mammogram Screening Scheduled    []  External Records Requested & Care Team Updated if Applicable    []  External Records Uploaded & Care Team Updated if Applicable    []  Pt Declined Scheduling Mammogram    []  Pt Will Schedule with External Provider / Order Routed & Care Team Updated if Applicable              Cervical Cancer Screening []  Pap Smear Scheduled in Primary Care or OBGYN    []  External Records Requested & Care Team Updated if Applicable       []  External Records Uploaded, Care Team Updated, & History Updated if Applicable    []  Patient Declined Scheduling Pap Smear    []  Patient Will Schedule with External Provider & Care Team Updated if Applicable                  Colorectal Cancer Screening []  Colonoscopy Case Request / Referral / Home Test Order Placed    [x]  External Records Requested & Care Team Updated if Applicable    []  External Records Uploaded, Care Team Updated, & History Updated if Applicable    []  Patient Declined Completing Colon Cancer Screening    []  Patient Will Schedule with External Provider & Care Team Updated if Applicable    []  Fit Kit Mailed (add the SmartPhrase under additional notes)    []  Reminded Patient to Complete Home Test                Diabetic Eye Exam []  Eye Exam Screening Order  Placed    []  Eye Camera Scheduled or Optometry/Ophthalmology Referral Placed    []  External Records Requested & Care Team Updated if Applicable    []  External Records Uploaded, Care Team Updated, & History Updated if Applicable    []  Patient Declined Scheduling Eye Exam    []  Patient Will Schedule with External Provider & Care Team Updated if Applicable             Blood Pressure Control []  Primary Care Follow Up Visit Scheduled     []  Remote Blood Pressure Reading Captured    []  Patient Declined Remote Reading or Scheduling Appt - Escalated to PCP    []  Patient Will Call Back or Send Portal Message with Reading                 HbA1c & Other Labs []  Overdue Lab(s) Ordered    []  Overdue Lab(s) Scheduled    []  External Records Uploaded & Care Team Updated if Applicable    []  Primary Care Follow Up Visit Scheduled     []  Reminded Patient to Complete A1c Home Test    []  Patient Declined Scheduling Labs or Will Call Back to Schedule    []  Patient Will Schedule with External Provider / Order Routed, & Care Team Updated if Applicable           Primary Care Appointment []  Primary Care Appt Scheduled    []  Patient Declined Scheduling or Will Call Back to Schedule    []  Pt Established with External Provider, Updated Care Team, & Informed Pt to Notify Payor if Applicable           Medication Adherence /    Statin Use []  Primary Care Appointment Scheduled    []  Patient Reminded to  Prescription    []  Patient Declined, Provider Notified if Needed    []  Sent Provider Message to Review to Evaluate Pt for Statin, Add Exclusion Dx Codes, Document   Exclusion in Problem List, Change Statin Intensity Level to Moderate or High Intensity if Applicable                Osteoporosis Screening [x]  Dexa Order Placed    []  Dexa Appointment Scheduled    []  External Records Requested & Care Team Updated    []  External Records Uploaded, Care Team Updated, & History Updated if Applicable    []  Patient Declined  Scheduling Dexa or Will Call Back to Schedule    []  Patient Will Schedule with External Provider / Order Routed & Care Team Updated if Applicable       Additional Notes:                         Message sent to patient through patient portal   Good afternoon, On 10/11/23 your provider ordered you a dexa and mammogram you will be contacted with your appointment date and time. If you have any questions please feel free to contact your pcp office.    Thanks in advance,  Alycia Brar-Clinic Care Coordinator

## 2023-11-09 DIAGNOSIS — Z71.89 COMPLEX CARE COORDINATION: ICD-10-CM

## 2024-03-07 ENCOUNTER — HOSPITAL ENCOUNTER (EMERGENCY)
Facility: HOSPITAL | Age: 69
Discharge: HOME | End: 2024-03-07
Attending: EMERGENCY MEDICINE
Payer: MEDICARE

## 2024-03-07 ENCOUNTER — OFFICE VISIT (OUTPATIENT)
Dept: FAMILY MEDICINE | Facility: CLINIC | Age: 69
End: 2024-03-07
Payer: MEDICARE

## 2024-03-07 VITALS
HEIGHT: 66 IN | BODY MASS INDEX: 27.97 KG/M2 | HEART RATE: 91 BPM | OXYGEN SATURATION: 94 % | RESPIRATION RATE: 18 BRPM | TEMPERATURE: 99 F | DIASTOLIC BLOOD PRESSURE: 80 MMHG | WEIGHT: 174 LBS | SYSTOLIC BLOOD PRESSURE: 130 MMHG

## 2024-03-07 VITALS
DIASTOLIC BLOOD PRESSURE: 46 MMHG | TEMPERATURE: 98 F | HEIGHT: 67 IN | HEART RATE: 80 BPM | BODY MASS INDEX: 27.31 KG/M2 | SYSTOLIC BLOOD PRESSURE: 126 MMHG | OXYGEN SATURATION: 97 % | WEIGHT: 174 LBS | RESPIRATION RATE: 18 BRPM

## 2024-03-07 DIAGNOSIS — R07.9 ACUTE CHEST PAIN: ICD-10-CM

## 2024-03-07 DIAGNOSIS — R07.89 OTHER CHEST PAIN: Primary | ICD-10-CM

## 2024-03-07 LAB
ANION GAP SERPL CALCULATED.3IONS-SCNC: 16 MMOL/L (ref 7–16)
ANISOCYTOSIS BLD QL SMEAR: NORMAL
BASOPHILS # BLD AUTO: 0.04 K/UL (ref 0–0.2)
BASOPHILS NFR BLD AUTO: 0.5 % (ref 0–1)
BUN SERPL-MCNC: 24 MG/DL (ref 7–18)
BUN/CREAT SERPL: 30 (ref 6–20)
CALCIUM SERPL-MCNC: 9.3 MG/DL (ref 8.5–10.1)
CHLORIDE SERPL-SCNC: 104 MMOL/L (ref 98–107)
CO2 SERPL-SCNC: 25 MMOL/L (ref 21–32)
CREAT SERPL-MCNC: 0.79 MG/DL (ref 0.55–1.02)
D DIMER PPP FEU-MCNC: 4.34 ΜG/ML (ref 0–0.47)
DIFFERENTIAL METHOD BLD: ABNORMAL
EGFR (NO RACE VARIABLE) (RUSH/TITUS): 81 ML/MIN/1.73M2
EOSINOPHIL # BLD AUTO: 0.81 K/UL (ref 0–0.5)
EOSINOPHIL NFR BLD AUTO: 9.2 % (ref 1–4)
ERYTHROCYTE [DISTWIDTH] IN BLOOD BY AUTOMATED COUNT: 13.5 % (ref 11.5–14.5)
GLUCOSE SERPL-MCNC: 156 MG/DL (ref 74–106)
HCT VFR BLD AUTO: 34.3 % (ref 38–47)
HGB BLD-MCNC: 10.6 G/DL (ref 12–16)
HYPOCHROMIA BLD QL SMEAR: NORMAL
LYMPHOCYTES # BLD AUTO: 2.74 K/UL (ref 1–4.8)
LYMPHOCYTES NFR BLD AUTO: 31.2 % (ref 27–41)
LYMPHOCYTES NFR BLD MANUAL: 34 % (ref 27–41)
MCH RBC QN AUTO: 28.1 PG (ref 27–31)
MCHC RBC AUTO-ENTMCNC: 30.9 G/DL (ref 32–36)
MCV RBC AUTO: 91 FL (ref 80–96)
MICROCYTES BLD QL SMEAR: NORMAL
MONOCYTES # BLD AUTO: 0.42 K/UL (ref 0–0.8)
MONOCYTES NFR BLD AUTO: 4.8 % (ref 2–6)
MONOCYTES NFR BLD MANUAL: 5 % (ref 2–6)
MPC BLD CALC-MCNC: 10.1 FL (ref 9.4–12.4)
NEUTROPHILS # BLD AUTO: 4.76 K/UL (ref 1.8–7.7)
NEUTROPHILS NFR BLD AUTO: 54.3 % (ref 53–65)
NEUTS SEG NFR BLD MANUAL: 61 % (ref 50–62)
NRBC BLD MANUAL-RTO: NORMAL %
PLATELET # BLD AUTO: 206 K/UL (ref 150–400)
PLATELET MORPHOLOGY: NORMAL
POTASSIUM SERPL-SCNC: 4.5 MMOL/L (ref 3.5–5.1)
RBC # BLD AUTO: 3.77 M/UL (ref 4.2–5.4)
SODIUM SERPL-SCNC: 140 MMOL/L (ref 136–145)
TROPONIN I SERPL DL<=0.01 NG/ML-MCNC: 5.4 PG/ML
WBC # BLD AUTO: 8.77 K/UL (ref 4.5–11)

## 2024-03-07 PROCEDURE — 25500020 PHARM REV CODE 255: Performed by: EMERGENCY MEDICINE

## 2024-03-07 PROCEDURE — 99285 EMERGENCY DEPT VISIT HI MDM: CPT | Mod: 25

## 2024-03-07 PROCEDURE — 99284 EMERGENCY DEPT VISIT MOD MDM: CPT | Performed by: EMERGENCY MEDICINE

## 2024-03-07 PROCEDURE — 85025 COMPLETE CBC W/AUTO DIFF WBC: CPT | Performed by: EMERGENCY MEDICINE

## 2024-03-07 PROCEDURE — 99499 UNLISTED E&M SERVICE: CPT | Mod: ,,, | Performed by: FAMILY MEDICINE

## 2024-03-07 PROCEDURE — 80048 BASIC METABOLIC PNL TOTAL CA: CPT | Performed by: EMERGENCY MEDICINE

## 2024-03-07 PROCEDURE — 93005 ELECTROCARDIOGRAM TRACING: CPT

## 2024-03-07 PROCEDURE — 84484 ASSAY OF TROPONIN QUANT: CPT | Performed by: EMERGENCY MEDICINE

## 2024-03-07 PROCEDURE — 93010 ELECTROCARDIOGRAM REPORT: CPT | Performed by: FAMILY MEDICINE

## 2024-03-07 PROCEDURE — 85379 FIBRIN DEGRADATION QUANT: CPT | Performed by: EMERGENCY MEDICINE

## 2024-03-07 RX ORDER — ASPIRIN 81 MG/1
81 TABLET ORAL 2 TIMES DAILY
COMMUNITY
Start: 2024-02-16

## 2024-03-07 RX ORDER — CETIRIZINE HYDROCHLORIDE, PSEUDOEPHEDRINE HYDROCHLORIDE 5; 120 MG/1; MG/1
2 TABLET, FILM COATED, EXTENDED RELEASE ORAL
COMMUNITY
Start: 2024-02-16

## 2024-03-07 RX ORDER — MELOXICAM 15 MG/1
15 TABLET ORAL DAILY
COMMUNITY

## 2024-03-07 RX ADMIN — IOPAMIDOL 100 ML: 755 INJECTION, SOLUTION INTRAVENOUS at 05:03

## 2024-03-07 NOTE — PROGRESS NOTES
Call made to [facility] Spoke with Jill. Informed of pt coming POV with  driving r/t chest pains, tightness last night.

## 2024-03-07 NOTE — DISCHARGE INSTRUCTIONS
INCREASE REST AND FLUIDS   MEDICATIONS AS DIRECTED  CALL OFFICE IN AM TO GIVE CONDITION REPORT AND SCHEDULE CARDIOLOGY FOLLOW UP  CONTINUE ASPIRIN AND JYOTI HOSE

## 2024-03-07 NOTE — ED NOTES
Iv attempted to right outer ac, got good blood return for lab draws but did not flush for int, dcd intact

## 2024-03-07 NOTE — ED PROVIDER NOTES
Encounter Date: 3/7/2024       History     Chief Complaint   Patient presents with    Chest Pain     Left chest pressure and pain last night but none today     PT IS A 68 YR OLD WF WITH CP NOTED LAST PM AND REFERRED TO ED PER DR VALLADARES FROM OFFICE FOR FURTHER EVALUATION. PT DENIES ADDITIONAL EPISODES TODAY.  PT STATES PAIN LOCATED SS AND L CHEST WITHOUT INCREASING OR DECREASING FACTORS AND DESCRIBED AS DULL.  PT DENIES N/V, FEVER, CHILLS, PALPITATIONS OR SYNCOPE.  PT HAS PRIOR CP SEVERAL YRS AGO WITH NEG EVAL IN ED AND  REFERRAL TO CARDIOLOGY, BUT PAIN RESOLVED AND PT DID NOT FOLLOW UP.  PT HAS INTERMITTENT DYSPNEA AND HAD R TKR 2 WEEKS AGO DR CARLSON IN [place].  PT NOTES [facility].  FAM HX MOM WITH STENTS  Past Medical History    Diagnosis Date Comments  Sarcoidosis, unspecified [D86.9]  in remission      Primary osteoarthritis of right knee (Chronic)   4/6/2023 - Present        Anxiety (Chronic)   3/14/2022 - Present        GERD (gastroesophageal reflux disease) (Chronic)   8/3/2020 - Present        Sarcoidosis (Chronic)   3/14/2022 - Present        Scalp psoriasis (Chronic)   8/3/2020 - Present        Lesion of tongue (Chronic)                   Review of patient's allergies indicates:  No Known Allergies  Past Medical History:   Diagnosis Date    Sarcoidosis, unspecified     in remission     Past Surgical History:   Procedure Laterality Date    APPENDECTOMY      CHOLECYSTECTOMY      HERNIA REPAIR      HYSTERECTOMY      right total knee Right      No family history on file.  Social History     Tobacco Use    Smoking status: Former     Types: Cigarettes    Smokeless tobacco: Never   Substance Use Topics    Alcohol use: Not Currently    Drug use: Not Currently     Review of Systems   Constitutional: Negative.    HENT: Negative.     Eyes: Negative.    Respiratory:  Positive for shortness of breath.    Cardiovascular:  Positive for chest pain.   Gastrointestinal: Negative.    Endocrine: Negative.     Genitourinary: Negative.    Musculoskeletal:  Positive for arthralgias.   Allergic/Immunologic: Negative.    Neurological: Negative.    Hematological: Negative.    Psychiatric/Behavioral: Negative.     All other systems reviewed and are negative.      Physical Exam     Initial Vitals [03/07/24 1511]   BP Pulse Resp Temp SpO2   (!) 126/55 84 18 98 °F (36.7 °C) 96 %      MAP       --         Physical Exam    Nursing note and vitals reviewed.  Constitutional: She appears well-developed and well-nourished. She is cooperative.   HENT:   Head: Normocephalic and atraumatic.   Right Ear: External ear normal.   Left Ear: External ear normal.   Nose: Nose normal.   Mouth/Throat: Oropharynx is clear and moist.   Eyes: Conjunctivae and EOM are normal. Pupils are equal, round, and reactive to light.   Neck: Trachea normal. Neck supple. No thyromegaly present. No tracheal deviation present. No JVD present.   Normal range of motion.  Cardiovascular:  Normal rate, regular rhythm, normal heart sounds and intact distal pulses.           No murmur heard.  Pulses:       Radial pulses are 3+ on the right side and 3+ on the left side.        Dorsalis pedis pulses are 3+ on the right side and 3+ on the left side.   Pulmonary/Chest: Breath sounds normal. No stridor. No respiratory distress. She has no wheezes. She has no rhonchi. She has no rales.   Abdominal: Abdomen is soft. Bowel sounds are normal. She exhibits no distension. There is no abdominal tenderness.   Musculoskeletal:         General: Tenderness (R KNEE MINIMAL;) present. No edema. Normal range of motion.      Right shoulder: Normal.      Left shoulder: Normal.      Right upper arm: Normal.      Left upper arm: Normal.      Right elbow: Normal.      Left elbow: Normal.      Right forearm: Normal.      Left forearm: Normal.      Right wrist: Normal.      Left wrist: Normal.      Right hand: Normal.      Left hand: Normal.      Cervical back: Normal range of motion and neck  supple.      Comments: NEG HOMANS  CALF IS NOT TENDER OR EDEMATOUS  JYOTI ON R LEG ONLY     Lymphadenopathy:     She has no cervical adenopathy.     She has no axillary adenopathy.   Neurological: She is alert and oriented to person, place, and time. She has normal strength. No cranial nerve deficit or sensory deficit. She displays a negative Romberg sign. GCS eye subscore is 4. GCS verbal subscore is 5. GCS motor subscore is 6.   Reflex Scores:       Bicep reflexes are 2+ on the right side and 2+ on the left side.       Patellar reflexes are 2+ on the right side and 2+ on the left side.  Skin: Skin is warm and dry. Capillary refill takes less than 2 seconds.   POST OP WOUND HEALING WELL WITH STERI STRIPS INTACT   Psychiatric: She has a normal mood and affect. Her speech is normal and behavior is normal. Judgment and thought content normal. Cognition and memory are normal.         Medical Screening Exam   See Full Note    ED Course   Procedures  Labs Reviewed   BASIC METABOLIC PANEL - Abnormal; Notable for the following components:       Result Value    Glucose 156 (*)     BUN 24 (*)     BUN/Creatinine Ratio 30 (*)     All other components within normal limits   CBC WITH DIFFERENTIAL - Abnormal; Notable for the following components:    RBC 3.77 (*)     Hemoglobin 10.6 (*)     Hematocrit 34.3 (*)     MCHC 30.9 (*)     Eosinophils % 9.2 (*)     Eosinophils, Absolute 0.81 (*)     All other components within normal limits   D DIMER, QUANTITATIVE - Abnormal; Notable for the following components:    D-Dimer 4.34 (*)     All other components within normal limits   TROPONIN I - Normal   CBC W/ AUTO DIFFERENTIAL    Narrative:     The following orders were created for panel order CBC Auto Differential.  Procedure                               Abnormality         Status                     ---------                               -----------         ------                     CBC with Differential[[id number]]       Abnormal             Final result               Manual Differential[[id number]]                             Final result                 Please view results for these tests on the individual orders.   MANUAL DIFFERENTIAL     EKG Readings: (Independently Interpreted)   Initial Reading: No STEMI. Rhythm: Normal Sinus Rhythm. Heart Rate: 88. Ectopy: No Ectopy. T Waves Flipped: AVR and V1. Clinical Impression: Normal Sinus Rhythm       Imaging Results              CTA Chest Non-Coronary (PE Studies) (Final result)  Result time 03/07/24 17:35:10      Final result by Kalpesh Carl MD (03/07/24 17:35:10)                   Impression:      No pulmonary embolus or acute abnormality in the chest.    Age-indeterminate left 3rd through 7th lateral rib fractures.  Correlate with patient's symptoms.      Electronically signed by: Kalpesh Carl  Date:    03/07/2024  Time:    17:35               Narrative:    EXAMINATION:  CTA CHEST NON CORONARY (PE STUDIES)    CLINICAL HISTORY:  Pulmonary embolism (PE) suspected, positive D-dimer;    TECHNIQUE:  Low dose axial images, sagittal and coronal reformations were obtained from the thoracic inlet to the lung bases following the IV administration of 100 mL of Isovue 370.  Contrast timing was optimized to evaluate the pulmonary arteries.  MIP images were performed.    The CT examination was performed using one or more of the following dose reduction techniques: Automated exposure control, adjustment of the mA and kV according to patient's size, use of acute or iterative reconstruction techniques.    COMPARISON:  Chest radiograph 03/07/2024    FINDINGS:  No pulmonary embolus.    Heart normal in size.  A few coronary and thoracic aortic calcifications.  No adenopathy.    Emphysema seen in the lungs.  There is no focal infiltrate.  No pneumothorax.  No pleural effusion.  There appear to be partial resection changes along the right middle lobe laterally.    No acute abnormality of the upper  abdomen.    Age-indeterminate nondisplaced fracture seen laterally of the left 3rd through 7th ribs.                                       X-Ray Chest 1 View (Final result)  Result time 03/07/24 15:34:22      Final result by Kalpesh Carl MD (03/07/24 15:34:22)                   Impression:      No acute findings.      Electronically signed by: Kalpesh Carl  Date:    03/07/2024  Time:    15:34               Narrative:    EXAMINATION:  XR CHEST 1 VIEW    CLINICAL HISTORY:  CHEST PAIN;    TECHNIQUE:  Single frontal view of the chest was performed.    COMPARISON:  11/23/2022    FINDINGS:  Heart size normal. Lungs clear. No pneumothorax or pleural effusion.                                    X-Rays:   Independently Interpreted Readings:   Chest X-Ray: 03/07/24 1536  X-Ray Chest 1 View  Performed: 03/07/24 1532  Final         Impression: No acute findings. Electronically signed by: Kalpesh Carl Date: 03/07/2024 Time: 15:34         Abdomen: Viewed and Other Results - Imaging    Updated   Order   03/07/24 1737  CTA Chest Non-Coronary (PE Studies)  Performed: 03/07/24 1721  Final         Impression: No pulmonary embolus or acute abnormality in the chest. Age-indeterminate left 3rd through 7th lateral rib fractures. Correlate with patient's symptoms. Electronically signed by: Kalpesh Carl Date: ...    03/07/24 1536  X-Ray Chest 1 View  Performed: 03/07/24 1532  Final         Impression: No acute findings. Electronically signed by: Kalpesh Carl Date: 03/07/2024 Time: 15:34           Medications   iopamidoL (ISOVUE-370) injection 100 mL (100 mLs Intravenous Given 3/7/24 1716)     Medical Decision Making  PT IS A 68 YR OLD WF WITH CP NOTED LAST PM AND REFERRED TO ED PER DR VALLADARES FROM OFFICE FOR FURTHER EVALUATION. PT DENIES ADDITIONAL EPISODES TODAY.  PT STATES PAIN LOCATED SS AND L CHEST WITHOUT INCREASING OR DECREASING FACTORS AND DESCRIBED AS DULL.  PT DENIES N/V, FEVER, CHILLS, PALPITATIONS OR SYNCOPE.  PT HAS PRIOR CP SEVERAL YRS  AGO WITH NEG EVAL IN ED AND  REFERRAL TO CARDIOLOGY, BUT PAIN RESOLVED AND PT DID NOT FOLLOW UP.  PT HAS INTERMITTENT DYSPNEA AND HAD R TKR 2 WEEKS AGO DR CARLSON IN [place].  PT NOTES [facility].      Amount and/or Complexity of Data Reviewed  Labs: ordered.     Details: Viewed and Other Results - Labs      Updated   Order   03/07/24 1624  D-Dimer, Quantitative  Collected: 03/07/24 1522  Final result  Specimen: Blood      D-Dimer 4.34 High  µg/mL       03/07/24 1602  CBC Auto Differential  Collected: 03/07/24 1522  Final result  Specimen: Blood         03/07/24 1602  CBC with Differential  Collected: 03/07/24 1522  Final result  Specimen: Blood      WBC 8.77 K/uL  RBC 3.77 Low  M/uL  Hemoglobin 10.6 Low  g/dL  Hematocrit 34.3 Low  %  MCV 91.0 fL  MCH 28.1 pg  MCHC 30.9 Low  g/dL  RDW 13.5 %  Platelet Count 206 K/uL  MPV 10.1 fL  Neutrophils % 54.3 %  Lymphocytes % 31.2 %  Neutrophils, Abs 4.76 K/uL  Lymphocytes, Absolute 2.74 K/uL  Diff Type Manual  Monocytes % 4.8 %  Eosinophils % 9.2 High  %  Basophils % 0.5 %  Monocytes, Absolute 0.42 K/uL  Eosinophils, Absolute 0.81 High  K/uL  Basophils, Absolute 0.04 K/uL       03/07/24 1602  Manual Differential  Collected: 03/07/24 1522  Final result  Specimen: Blood      Segmented Neutrophils, Man % 61 %  Lymphocytes, Man % 34 %  Monocytes, Man % 5 %  nRBC, Manual -  Platelet Morphology Normal  Anisocytosis 1+  Microcytosis 1+  Hypochromic 1+       03/07/24 1547  Troponin I  Collected: 03/07/24 1522  Final result  Specimen: Blood      Troponin I High Sensitivity 5.4 pg/mL       03/07/24 1547  Basic Metabolic Panel  Collected: 03/07/24 1522  Final result  Specimen: Blood      Sodium 140 mmol/L  Potassium 4.5 mmol/L  Chloride 104 mmol/L  CO2 25 mmol/L  Anion Gap 16 mmol/L  Glucose 156 High  mg/dL  BUN 24 High  mg/dL  Creatinine 0.79 mg/dL  BUN/Creatinine Ratio 30 High   Calcium 9.3 mg/dL  eGFR 81 mL/min/1.73m2            Radiology: ordered.     Details:  03/07/24 1536  X-Ray Chest 1 View  Performed: 03/07/24 1532  Final         Impression: No acute findings. Electronically signed by: Kalpesh Carl Date: 03/07/2024 Time: 15:34        Discussion of management or test interpretation with external provider(s): EXAM  EKG NSR, CARDIAC  MONITOR NSR  D DIMER 4-CT CHEST NEG FOR PE, CXR NEG  TROPONIN NEG   DC HOME IN STABLE CONDITION WITH NO FURTHER PAIN  PT DOES UNDERSTAND TO FOLLOW UP WITH     Risk  Prescription drug management.                                      Clinical Impression:   Final diagnoses:  [R07.9] Acute chest pain        ED Disposition Condition    Discharge Stable          ED Prescriptions    None       Follow-up Information       Follow up With Specialties Details Why Contact Info    Jordyn Jay MD Family Medicine Call in 1 day If symptoms worsen  CALL [phone]  [facility] [place] [address]  [phone]               Velma Dorsey MD  03/07/24 9637

## 2024-03-07 NOTE — ED TRIAGE NOTES
I received a call from Dr Olvera clinic, stated that they were sending a pt over here to be seen for chest pain, chest pain was last night, wants labs drawn for this, clinic did states that pt had no chest pain today, I asked if they were sending her straight here for chest pain or had they seen her today and she states that Dr Valderrama already saw her today but wanted labs done. Pt is a 2 week post-op right total knee

## 2024-03-07 NOTE — PROGRESS NOTES
"Clinic Note    Patient Name: Pau Odell  : 1955  MRN: [id number]    Chief Complaint   Patient presents with    Chest Pain     States feels like it is just tightness and only at night.        HPI:    Ms. Pau Odell is a 68 y.o. female who presents to clinic today with CC of chest pain, tightness/pressure in chest, and radiation of pain to L neck/jaw "all night last night stating "I didn't sleep at all". Denies associated SOB. Denies associated nausea. Denies exertional symptoms.   Reports she does not have pain currently. States pain was not sharp but reports it felt like a dull pressure/tightness.  She states she has had a prior "wrap surgery" and no longer has reflux. States she is confidence this was not reflux.  Reports she did have a knee replacement 2-3 weeks ago in [place]. States she has been doing great with this and doing well in therapy. Reports she did not go to therapy today because she did not feel well. Denies swelling in legs.  Reports she forgot to take her lexapro for about a week after surgery and did have one panic attack after surgery but states that she is back on lexapro and this issue seems to be well controlled. Denies anxiety or feeling worried during the night last night.  Reports that she has and chest discomfort a few times during the day since surgery but it was not as severe as it was last night.   States she did have similar pain several years ago and was sent to ER. Reports cardiac evaluation in ER, at that time, was normal. Reports Cardiology appt outpatient was scheduled but she did not go because symptoms had completely resolved. States, however, she does want to follow up with cardiology now and requested referral to Dr. Turpin at [facility].   Patient is, otherwise, without complaints.     Medications:  Medication List with Changes/Refills   Current Medications    ASPIRIN (ECOTRIN) 81 MG EC TABLET    Take 81 mg by mouth 2 (two) times daily.    CELECOXIB (CELEBREX) " 200 MG CAPSULE    Take 1 capsule (200 mg total) by mouth once daily.    CHOLESTYRAMINE (QUESTRAN) 4 GRAM PACKET    Take 1 packet (4 g total) by mouth daily as needed (diarrhea).    DIPHENHYD/PHENYLEPH/ACETAMINOP (BENADRYL ALLERGY/COLD ORAL)    Take 1 tablet by mouth every evening.    ERGOCALCIFEROL (ERGOCALCIFEROL) 50,000 UNIT CAP    Take 1 capsule (50,000 Units total) by mouth every 7 days.    ESCITALOPRAM OXALATE (LEXAPRO) 20 MG TABLET    Take 1 tablet by mouth every morning.    HYDROXYZINE HCL (ATARAX) 25 MG TABLET    Take 1 tablet (25 mg total) by mouth once daily.    MECOBALAMIN (B12 ACTIVE ORAL)    Take 1 tablet by mouth once daily.    MELATONIN 10 MG CHEW    Take 1 tablet by mouth nightly.    SENNA PLUS 8.6-50 MG PER TABLET    Take 2 tablets by mouth.        Allergies: Patient has no known allergies.      Past Medical History:    No past medical history on file.    Past Surgical History:    Past Surgical History:   Procedure Laterality Date    CHOLECYSTECTOMY      HERNIA REPAIR      HYSTERECTOMY           Social History:    Social History     Tobacco Use   Smoking Status Former    Types: Cigarettes   Smokeless Tobacco Never     Social History     Substance and Sexual Activity   Alcohol Use Not Currently     Social History     Substance and Sexual Activity   Drug Use Not Currently         Family History:    No family history on file.    Review of Systems:    Review of Systems   Constitutional:  Negative for appetite change, chills, fatigue, fever and unexpected weight change.   Eyes:  Negative for visual disturbance.   Respiratory:  Negative for cough and shortness of breath.    Cardiovascular:  Positive for chest pain. Negative for leg swelling.   Gastrointestinal:  Negative for abdominal pain, change in bowel habit, constipation, diarrhea, nausea and vomiting.   Musculoskeletal:  Negative for arthralgias.   Integumentary:  Negative for rash.   Neurological:  Negative for dizziness and headaches.  "  Psychiatric/Behavioral:  The patient is not nervous/anxious.         Vitals:    Vitals:    03/07/24 1309   BP: 130/80   BP Location: Left arm   Patient Position: Sitting   BP Method: Medium (Automatic)   Pulse: 91   Resp: 18   Temp: 98.5 °F (36.9 °C)   TempSrc: Oral   SpO2: (!) 94%   Weight: 78.9 kg (174 lb)   Height: 5' 6" (1.676 m)       Body mass index is 28.08 kg/m².    Wt Readings from Last 3 Encounters:   03/07/24 1309 78.9 kg (174 lb)   10/11/23 1623 79 kg (174 lb 3.2 oz)   01/26/23 0908 83.9 kg (185 lb)        Physical Exam:    Physical Exam  Constitutional:       General: She is not in acute distress.     Appearance: Normal appearance.   HENT:      Nose: Nose normal.      Mouth/Throat:      Mouth: Mucous membranes are moist.      Pharynx: Oropharynx is clear.   Eyes:      Conjunctiva/sclera: Conjunctivae normal.   Cardiovascular:      Rate and Rhythm: Normal rate and regular rhythm.      Heart sounds: Normal heart sounds. No murmur heard.  Pulmonary:      Effort: Pulmonary effort is normal. No respiratory distress.      Breath sounds: Normal breath sounds. No wheezing, rhonchi or rales.   Abdominal:      General: Bowel sounds are normal.      Palpations: Abdomen is soft.      Tenderness: There is no abdominal tenderness.   Musculoskeletal:      Cervical back: Neck supple.      Right lower leg: No edema.      Left lower leg: No edema.   Skin:     Findings: No rash.   Neurological:      General: No focal deficit present.      Mental Status: She is alert. Mental status is at baseline.   Psychiatric:         Mood and Affect: Mood normal.         Assessment/Plan:   1. Other chest pain  -     Ambulatory referral/consult to Cardiology; Future; Expected date: 03/14/2024; referral clerk notified of referral.  - Given patient's concerning symptoms during the night patient was sent to ER for further evaluation including cardiac enzymes and EKG. ER at [facility] notified. Patient declined ambulance. She is not having " chest pain now. She is accompanied to this visit by her . He will drive her across the street to the ER.          Active Problem List with Overview Notes    Diagnosis Date Noted    Primary osteoarthritis of right knee 04/06/2023    Lesion of tongue 03/24/2022    Anxiety 03/14/2022    Sarcoidosis 03/14/2022    GERD (gastroesophageal reflux disease) 08/03/2020    Scalp psoriasis 08/03/2020        RTC for ER follow up. RTC as scheduled for chronic follow up. RTC sooner if needed.  Patient voiced understanding and is agreeable to plan.      Kayce Jay MD    Family Medicine

## 2024-03-25 LAB
OHS QRS DURATION: 84 MS
OHS QTC CALCULATION: 467 MS

## 2024-04-09 RX ORDER — ESCITALOPRAM OXALATE 20 MG/1
20 TABLET ORAL EVERY MORNING
Qty: 90 TABLET | Refills: 1 | Status: SHIPPED | OUTPATIENT
Start: 2024-04-09

## 2024-05-20 DIAGNOSIS — F41.9 ANXIETY: Chronic | ICD-10-CM

## 2024-05-20 RX ORDER — HYDROXYZINE HYDROCHLORIDE 25 MG/1
25 TABLET, FILM COATED ORAL DAILY
Qty: 90 TABLET | Refills: 1 | Status: SHIPPED | OUTPATIENT
Start: 2024-05-20

## 2024-06-09 DIAGNOSIS — Z71.89 COMPLEX CARE COORDINATION: ICD-10-CM

## 2024-07-31 LAB
BCS RECOMMENDATION EXT: NORMAL
BMD RECOMMENDATION EXT: NORMAL

## 2024-08-01 ENCOUNTER — OFFICE VISIT (OUTPATIENT)
Dept: FAMILY MEDICINE | Facility: CLINIC | Age: 69
End: 2024-08-01
Payer: MEDICARE

## 2024-08-01 VITALS
TEMPERATURE: 98 F | WEIGHT: 173.38 LBS | DIASTOLIC BLOOD PRESSURE: 76 MMHG | OXYGEN SATURATION: 96 % | HEIGHT: 66 IN | HEART RATE: 86 BPM | RESPIRATION RATE: 20 BRPM | BODY MASS INDEX: 27.86 KG/M2 | SYSTOLIC BLOOD PRESSURE: 122 MMHG

## 2024-08-01 DIAGNOSIS — R42 VERTIGO: Primary | ICD-10-CM

## 2024-08-01 PROCEDURE — 99213 OFFICE O/P EST LOW 20 MIN: CPT | Mod: ,,, | Performed by: FAMILY MEDICINE

## 2024-08-01 RX ORDER — ONDANSETRON 4 MG/1
4 TABLET, FILM COATED ORAL EVERY 8 HOURS PRN
Qty: 20 TABLET | Refills: 0 | Status: SHIPPED | OUTPATIENT
Start: 2024-08-01

## 2024-08-01 RX ORDER — MECLIZINE HYDROCHLORIDE 25 MG/1
25 TABLET ORAL 3 TIMES DAILY PRN
Qty: 30 TABLET | Refills: 2 | Status: SHIPPED | OUTPATIENT
Start: 2024-08-01

## 2024-08-01 NOTE — PROGRESS NOTES
Clinic Note    Patient Name: Pau Odell  : 1955  MRN: [id number]    Chief Complaint   Patient presents with    Dizziness    Health Maintenance     Hepatitis C Screening Never done  TETANUS VACCINE Never done  Mammogram Never done  DEXA Scan Never done  Colorectal Cancer Screening Never done  Shingles Vaccine(1 of 2) Never done  RSV Vaccine (Age 60+ and Pregnant patients)(1 - 1-dose 60+ series) Never done  COVID-19 Vaccine(3 - 2023-24 season) due on 2023        HPI:    Ms. Pau Odell is a 69 y.o. female who presents to clinic today with CC of dizziness intermittently X several days - worsened yesterday. Reports dizziness is worse with position changes such as standing up suddenly or turning her head suddenly, rolling over in bed, etc. Denies vision changes, chest pain, SOB, numbness, tingling, weakness, slurred speech, and blurry vision.   Denies any recent falls or trauma to head.  Patient is, otherwise, without complaints.     Medications:  Medication List with Changes/Refills   New Medications    MECLIZINE (ANTIVERT) 25 MG TABLET    Take 1 tablet (25 mg total) by mouth 3 (three) times daily as needed for Dizziness.    ONDANSETRON (ZOFRAN) 4 MG TABLET    Take 1 tablet (4 mg total) by mouth every 8 (eight) hours as needed for Nausea.   Current Medications    CHOLESTYRAMINE (QUESTRAN) 4 GRAM PACKET    Take 1 packet (4 g total) by mouth daily as needed (diarrhea).    DIPHENHYD/PHENYLEPH/ACETAMINOP (BENADRYL ALLERGY/COLD ORAL)    Take 1 tablet by mouth every evening.    ESCITALOPRAM OXALATE (LEXAPRO) 20 MG TABLET    Take 1 tablet (20 mg total) by mouth every morning.    HYDROXYZINE HCL (ATARAX) 25 MG TABLET    Take 1 tablet (25 mg total) by mouth once daily.    MECOBALAMIN (B12 ACTIVE ORAL)    Take 1 tablet by mouth once daily.    MELATONIN 10 MG CHEW    Take 1 tablet by mouth nightly.   Discontinued Medications    ASPIRIN (ECOTRIN) 81 MG EC TABLET    Take 81 mg by mouth 2 (two) times daily.     CELECOXIB (CELEBREX) 200 MG CAPSULE    Take 1 capsule (200 mg total) by mouth once daily.    ERGOCALCIFEROL (ERGOCALCIFEROL) 50,000 UNIT CAP    Take 1 capsule (50,000 Units total) by mouth every 7 days.    MELOXICAM (MOBIC) 15 MG TABLET    Take 15 mg by mouth once daily.    SENNA PLUS 8.6-50 MG PER TABLET    Take 2 tablets by mouth.        Allergies: Patient has no known allergies.      Past Medical History:    Past Medical History:   Diagnosis Date    Sarcoidosis, unspecified     in remission       Past Surgical History:    Past Surgical History:   Procedure Laterality Date    APPENDECTOMY      CHOLECYSTECTOMY      HERNIA REPAIR      HYSTERECTOMY      right total knee Right          Social History:    Social History     Tobacco Use   Smoking Status Former    Types: Cigarettes   Smokeless Tobacco Never     Social History     Substance and Sexual Activity   Alcohol Use Not Currently     Social History     Substance and Sexual Activity   Drug Use Not Currently         Family History:    No family history on file.    Review of Systems:    Review of Systems   Constitutional:  Negative for appetite change, chills, fatigue, fever and unexpected weight change.   Eyes:  Negative for visual disturbance.   Respiratory:  Negative for cough and shortness of breath.    Cardiovascular:  Negative for chest pain and leg swelling.   Gastrointestinal:  Negative for abdominal pain, change in bowel habit, constipation, diarrhea, nausea and vomiting.   Musculoskeletal:  Negative for arthralgias.   Integumentary:  Negative for rash.   Neurological:  Positive for dizziness and vertigo. Negative for syncope, facial asymmetry, speech difficulty, weakness, light-headedness, numbness, headaches and coordination difficulties.   Psychiatric/Behavioral:  The patient is not nervous/anxious.         Vitals:    Vitals:    08/01/24 0858 08/01/24 0859 08/01/24 0900   BP: 130/72 130/76 122/76   BP Location: Left arm Left arm Left arm   Patient  "Position: Lying Sitting Standing   BP Method: Large (Manual) Large (Manual) Large (Manual)   Pulse: 76 80 86   Resp: 20     Temp: 98.1 °F (36.7 °C)     TempSrc: Oral     SpO2: 96%     Weight: 78.7 kg (173 lb 6.4 oz)     Height: 5' 6" (1.676 m)         Body mass index is 27.99 kg/m².    Wt Readings from Last 3 Encounters:   08/01/24 0858 78.7 kg (173 lb 6.4 oz)   03/07/24 1511 78.9 kg (174 lb)   03/07/24 1309 78.9 kg (174 lb)        Physical Exam:    Physical Exam  Constitutional:       General: She is not in acute distress.     Appearance: Normal appearance.   HENT:      Head: Normocephalic and atraumatic.      Right Ear: Tympanic membrane normal.      Left Ear: Tympanic membrane normal.      Nose: Nose normal.      Mouth/Throat:      Mouth: Mucous membranes are moist.      Pharynx: Oropharynx is clear.   Eyes:      Extraocular Movements: Extraocular movements intact.      Conjunctiva/sclera: Conjunctivae normal.      Pupils: Pupils are equal, round, and reactive to light.   Cardiovascular:      Rate and Rhythm: Normal rate and regular rhythm.      Heart sounds: Normal heart sounds. No murmur heard.  Pulmonary:      Effort: Pulmonary effort is normal. No respiratory distress.      Breath sounds: Normal breath sounds. No wheezing, rhonchi or rales.   Abdominal:      General: Bowel sounds are normal.      Palpations: Abdomen is soft.      Tenderness: There is no abdominal tenderness.   Musculoskeletal:      Cervical back: Neck supple.      Right lower leg: No edema.      Left lower leg: No edema.   Skin:     Findings: No rash.   Neurological:      General: No focal deficit present.      Mental Status: She is alert and oriented to person, place, and time. Mental status is at baseline.      Cranial Nerves: No cranial nerve deficit.      Sensory: No sensory deficit.      Motor: No weakness.      Coordination: Coordination normal.      Gait: Gait normal.   Psychiatric:         Mood and Affect: Mood normal. "         Assessment/Plan:   1. Vertigo  -     meclizine (ANTIVERT) 25 mg tablet; Take 1 tablet (25 mg total) by mouth 3 (three) times daily as needed for Dizziness.  Dispense: 30 tablet; Refill: 2  -     ondansetron (ZOFRAN) 4 MG tablet; Take 1 tablet (4 mg total) by mouth every 8 (eight) hours as needed for Nausea.  Dispense: 20 tablet; Refill: 0  - Discussed reasons to RTC or go to ER. If symptoms worsen, fail to improve, or new symptoms develop advised to RTC or go to ER.        Colonoscopy: Dr. [facility]  Mammogram/bone density - [facility] [place] [facility]. 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 RTC sooner if needed.  Patient voiced understanding and is agreeable to plan.      Kayce Jay MD    Family Medicine

## 2024-08-01 NOTE — Clinical Note
Colonoscopy: Dr. Layne Hyatt [facility] Mammogram/bone density - [facility] Dr. [facility]

## 2024-08-05 ENCOUNTER — PATIENT OUTREACH (OUTPATIENT)
Facility: HOSPITAL | Age: 69
End: 2024-08-05
Payer: MEDICARE

## 2024-08-15 ENCOUNTER — TELEPHONE (OUTPATIENT)
Dept: FAMILY MEDICINE | Facility: CLINIC | Age: 69
End: 2024-08-15
Payer: MEDICARE

## 2024-08-15 DIAGNOSIS — R42 VERTIGO: Primary | ICD-10-CM

## 2024-08-15 DIAGNOSIS — R42 DIZZINESS AND GIDDINESS: ICD-10-CM

## 2024-08-15 NOTE — TELEPHONE ENCOUNTER
Patient reports she continues to have vertigo. Requests referral to ENT (Dr. Kenny). Referral placed. CT head ordered. Voiced understanding.

## 2024-08-20 ENCOUNTER — PATIENT OUTREACH (OUTPATIENT)
Facility: HOSPITAL | Age: 69
End: 2024-08-20
Payer: MEDICARE

## 2024-08-20 ENCOUNTER — TELEPHONE (OUTPATIENT)
Dept: FAMILY MEDICINE | Facility: CLINIC | Age: 69
End: 2024-08-20
Payer: MEDICARE

## 2024-08-20 ENCOUNTER — HOSPITAL ENCOUNTER (OUTPATIENT)
Dept: RADIOLOGY | Facility: HOSPITAL | Age: 69
Discharge: HOME | End: 2024-08-20
Attending: FAMILY MEDICINE
Payer: MEDICARE

## 2024-08-20 DIAGNOSIS — R42 DIZZINESS AND GIDDINESS: ICD-10-CM

## 2024-08-20 DIAGNOSIS — R42 VERTIGO: ICD-10-CM

## 2024-08-20 PROCEDURE — 70450 CT HEAD/BRAIN W/O DYE: CPT | Mod: TC

## 2024-08-20 NOTE — TELEPHONE ENCOUNTER
Contacted pt in regards to CT. Informed pt of CT results. Pt voiced understanding and had no further questions. Pt states she does not want to see  at this time. Pt states she would like a follow up appt with  first. Appt made.     ----- Message from Jordyn Jay MD sent at 8/20/2024  1:01 PM CDT -----  Please contact the patient and let them know that their results were normal. CT head normal. Please ask Og for update on referral to ENT (Dr. Kenny). Thanks!

## 2024-08-20 NOTE — PROGRESS NOTES
Population Health Chart Review & Patient Outreach Details      Further Action Needed If Patient Returns Outreach:        24 upload note from [facility] no records on the patient TC,    Updates Requested / Reviewed:     []  Care Everywhere    []     []  External Sources ([facility], [facility], [facility], etc.)    [] [facility]   [] [facility]   [] Other:    []  Care Team Updated   []  Removed  or Duplicate Orders   []  Immunization Reconciliation Completed / Queried    [] [place]   [] [place]   [] [place]   [] [place]      Health Maintenance Topics Addressed and Outreach Outcomes / Actions Taken:             Breast Cancer Screening []  Mammogram Order Placed    []  Mammogram Screening Scheduled    []  External Records Requested & Care Team Updated if Applicable    []  External Records Uploaded & Care Team Updated if Applicable    []  Pt Declined Scheduling Mammogram    []  Pt Will Schedule with External Provider / Order Routed & Care Team Updated if Applicable              Cervical Cancer Screening []  Pap Smear Scheduled in Primary Care or OBGYN    []  External Records Requested & Care Team Updated if Applicable       []  External Records Uploaded, Care Team Updated, & History Updated if Applicable    []  Patient Declined Scheduling Pap Smear    []  Patient Will Schedule with External Provider & Care Team Updated if Applicable                  Colorectal Cancer Screening []  Colonoscopy Case Request / Referral / Home Test Order Placed    []  External Records Requested & Care Team Updated if Applicable    [x]  External Records Uploaded, Care Team Updated, & History Updated if Applicable    []  Patient Declined Completing Colon Cancer Screening    []  Patient Will Schedule with External Provider & Care Team Updated if Applicable    []  Fit Kit Mailed (add the SmartPhrase under additional notes)    []  Reminded Patient to Complete Home Test                Diabetic Eye Exam []  Eye Exam Screening Order Placed     []  Eye Camera Scheduled or Optometry/Ophthalmology Referral Placed    []  External Records Requested & Care Team Updated if Applicable    []  External Records Uploaded, Care Team Updated, & History Updated if Applicable    []  Patient Declined Scheduling Eye Exam    []  Patient Will Schedule with External Provider & Care Team Updated if Applicable             Blood Pressure Control []  Primary Care Follow Up Visit Scheduled     []  Remote Blood Pressure Reading Captured    []  Patient Declined Remote Reading or Scheduling Appt - Escalated to PCP    []  Patient Will Call Back or Send Portal Message with Reading                 HbA1c & Other Labs []  Overdue Lab(s) Ordered    []  Overdue Lab(s) Scheduled    []  External Records Uploaded & Care Team Updated if Applicable    []  Primary Care Follow Up Visit Scheduled     []  Reminded Patient to Complete A1c Home Test    []  Patient Declined Scheduling Labs or Will Call Back to Schedule    []  Patient Will Schedule with External Provider / Order Routed, & Care Team Updated if Applicable           Primary Care Appointment []  Primary Care Appt Scheduled    []  Patient Declined Scheduling or Will Call Back to Schedule    []  Pt Established with External Provider, Updated Care Team, & Informed Pt to Notify Payor if Applicable           Medication Adherence /    Statin Use []  Primary Care Appointment Scheduled    []  Patient Reminded to  Prescription    []  Patient Declined, Provider Notified if Needed    []  Sent Provider Message to Review to Evaluate Pt for Statin, Add Exclusion Dx Codes, Document   Exclusion in Problem List, Change Statin Intensity Level to Moderate or High Intensity if Applicable                Osteoporosis Screening []  Dexa Order Placed    []  Dexa Appointment Scheduled    []  External Records Requested & Care Team Updated    []  External Records Uploaded, Care Team Updated, & History Updated if Applicable    []  Patient Declined Scheduling  Dexa or Will Call Back to Schedule    []  Patient Will Schedule with External Provider / Order Routed & Care Team Updated if Applicable       Additional Notes:

## 2024-08-21 ENCOUNTER — OFFICE VISIT (OUTPATIENT)
Dept: FAMILY MEDICINE | Facility: CLINIC | Age: 69
End: 2024-08-21
Payer: MEDICARE

## 2024-08-21 VITALS
SYSTOLIC BLOOD PRESSURE: 121 MMHG | HEIGHT: 66 IN | DIASTOLIC BLOOD PRESSURE: 72 MMHG | RESPIRATION RATE: 17 BRPM | OXYGEN SATURATION: 96 % | HEART RATE: 84 BPM | TEMPERATURE: 98 F | BODY MASS INDEX: 28.28 KG/M2 | WEIGHT: 176 LBS

## 2024-08-21 DIAGNOSIS — R42 DIZZINESS: Primary | ICD-10-CM

## 2024-08-21 DIAGNOSIS — R55 SYNCOPE, UNSPECIFIED SYNCOPE TYPE: ICD-10-CM

## 2024-08-21 PROCEDURE — 99212 OFFICE O/P EST SF 10 MIN: CPT | Mod: ,,, | Performed by: FAMILY MEDICINE

## 2024-08-21 NOTE — PROGRESS NOTES
"Clinic Note    Patient Name: Pau Odell  : 1955  MRN: [id number]    Chief Complaint   Patient presents with    Follow-up    Referral     Pt has questions about referral to         HPI:    Ms. Pau Odell is a 69 y.o. female who presents to clinic today with CC of follow up on vertigo.  Reports she is still having intermittent issues with dizziness. CT head negative.  She was previously referred to Dr. Kenny (ENT) for persistent issues with dizziness.   Patient reports she has had a "crick" in her neck X several months. Reports she thought it was the way she was sleeping. Reports, however, this issue has not resolved and she has changed pillows, etc.  Reports pain in neck is worse with movement. Denies feeling knot or mass.   Reports she feels like PT might help both her issues. She is agreeable to keeping appt with Dr. Kenny but would like to try therapy as well.   Patient is, otherwise, without complaints.     Medications:  Medication List with Changes/Refills   Current Medications    CHOLESTYRAMINE (QUESTRAN) 4 GRAM PACKET    Take 1 packet (4 g total) by mouth daily as needed (diarrhea).    DIPHENHYD/PHENYLEPH/ACETAMINOP (BENADRYL ALLERGY/COLD ORAL)    Take 1 tablet by mouth every evening.    ESCITALOPRAM OXALATE (LEXAPRO) 20 MG TABLET    Take 1 tablet (20 mg total) by mouth every morning.    HYDROXYZINE HCL (ATARAX) 25 MG TABLET    Take 1 tablet (25 mg total) by mouth once daily.    MECLIZINE (ANTIVERT) 25 MG TABLET    Take 1 tablet (25 mg total) by mouth 3 (three) times daily as needed for Dizziness.    MECOBALAMIN (B12 ACTIVE ORAL)    Take 1 tablet by mouth once daily.    MELATONIN 10 MG CHEW    Take 1 tablet by mouth nightly.    ONDANSETRON (ZOFRAN) 4 MG TABLET    Take 1 tablet (4 mg total) by mouth every 8 (eight) hours as needed for Nausea.        Allergies: Patient has no known allergies.      Past Medical History:    Past Medical History:   Diagnosis Date    Sarcoidosis, " "unspecified     in remission       Past Surgical History:    Past Surgical History:   Procedure Laterality Date    APPENDECTOMY      CHOLECYSTECTOMY      HERNIA REPAIR      HYSTERECTOMY      right total knee Right          Social History:    Social History     Tobacco Use   Smoking Status Former    Types: Cigarettes   Smokeless Tobacco Never     Social History     Substance and Sexual Activity   Alcohol Use Not Currently     Social History     Substance and Sexual Activity   Drug Use Not Currently         Family History:    No family history on file.    Review of Systems:    Review of Systems   Constitutional:  Negative for appetite change, chills, fatigue, fever and unexpected weight change.   Eyes:  Negative for visual disturbance.   Respiratory:  Negative for cough and shortness of breath.    Cardiovascular:  Negative for chest pain and leg swelling.   Gastrointestinal:  Negative for abdominal pain, change in bowel habit, constipation, diarrhea, nausea and vomiting.   Musculoskeletal:  Negative for arthralgias.   Integumentary:  Negative for rash.   Neurological:  Positive for dizziness. Negative for headaches.   Psychiatric/Behavioral:  The patient is not nervous/anxious.         Vitals:    Vitals:    08/21/24 1321   BP: 121/72   BP Location: Left arm   Patient Position: Sitting   BP Method: Medium (Automatic)   Pulse: 84   Resp: 17   Temp: 98.4 °F (36.9 °C)   TempSrc: Oral   SpO2: 96%   Weight: 79.8 kg (176 lb)   Height: 5' 6" (1.676 m)       Body mass index is 28.41 kg/m².    Wt Readings from Last 3 Encounters:   08/21/24 1321 79.8 kg (176 lb)   08/01/24 0858 78.7 kg (173 lb 6.4 oz)   03/07/24 1511 78.9 kg (174 lb)        Physical Exam:    Physical Exam  Constitutional:       General: She is not in acute distress.     Appearance: Normal appearance.   HENT:      Nose: Nose normal.      Mouth/Throat:      Mouth: Mucous membranes are moist.      Pharynx: Oropharynx is clear.   Eyes:      Conjunctiva/sclera: " Conjunctivae normal.   Cardiovascular:      Rate and Rhythm: Normal rate and regular rhythm.      Heart sounds: Normal heart sounds. No murmur heard.  Pulmonary:      Effort: Pulmonary effort is normal. No respiratory distress.      Breath sounds: Normal breath sounds. No wheezing, rhonchi or rales.   Abdominal:      General: Bowel sounds are normal.      Palpations: Abdomen is soft.      Tenderness: There is no abdominal tenderness.   Musculoskeletal:      Cervical back: Neck supple.      Right lower leg: No edema.      Left lower leg: No edema.   Skin:     Findings: No rash.   Neurological:      General: No focal deficit present.      Mental Status: She is alert. Mental status is at baseline.   Psychiatric:         Mood and Affect: Mood normal.         Assessment/Plan:   1. Dizziness  -     VAS US Carotid Bilateral; Future    2. Syncope, unspecified syncope type  -     VAS US Carotid Bilateral; Future    PT  for vertigo and neck pain if carotid dopplers ok  Keep appt with ENT (Dr. 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 RTC sooner if needed.  Patient voiced understanding and is agreeable to plan.      Kayce Jay MD    Family Medicine

## 2024-08-22 ENCOUNTER — PATIENT OUTREACH (OUTPATIENT)
Facility: HOSPITAL | Age: 69
End: 2024-08-22
Payer: MEDICARE

## 2024-08-22 NOTE — LETTER
AUTHORIZATION FOR RELEASE OF   CONFIDENTIAL INFORMATION    Dear Dr. Key,    We are seeing Pau Odell, date of birth 1955, in the clinic at [facility]. Jordyn Jay MD is the patient's PCP. Pau Odell has an outstanding lab/procedure at the time we reviewed her chart. In order to help keep her health information updated, she has authorized us to request the following medical record(s):        ( x )  MAMMOGRAM                                      (  )  COLONOSCOPY      (  )  PAP SMEAR                                          (  )  OUTSIDE LAB RESULTS     ( x )  DEXA SCAN                                          (  )  EYE EXAM            (  )  FOOT EXAM                                          (  )  ENTIRE RECORD     (  )  OUTSIDE IMMUNIZATIONS                 (  )  _______________         Please fax records to [facility], Mallary Harvey, LPN Care Coordinator, [phone].      If you have any questions, please contact Carmen at [phone]. .           Patient Name: Pau Odell  : 1955  Patient Phone #: [phone]                Pau Odell  MRN: [id number]  : 1955  Age: 69 y.o.  Sex: female         Patient/Legal Guardian Signature  This signature was collected at 2024    SELF       _______________________________   Printed Name/Relationship to Patient      Consent for Examination and Treatment: I hereby authorize the providers and employees of [facility] ([facility]) to provide medical treatment/services which includes, but is not limited to, performing and administering tests and diagnostic procedures that are deemed necessary, including, but not limited to, imaging examinations, blood tests and other laboratory procedures as may be required by the hospital, clinic, or may be ordered by my physician(s) or persons working under the general and/or special instructions of my physician(s).      I understand and agree that  this consent covers all authorized persons, including but not limited to physicians, residents, nurse practitioners, physicians' assistants, specialists, consultants, student nurses, and independently contracted physicians, who are called upon by the physician in charge, to carry out the diagnostic procedures and medical or surgical treatment.     I hereby authorize [facility] to retain or dispose of any specimens or tissue, should there be such remaining from any test or procedure.     I hereby authorize and give consent for [facility] providers and employees to take photographs, images or videotapes of such diagnostic, surgical or treatment procedures of Patient as may be required by [facility] or as may be ordered by a physician. I further acknowledge and agree that [facility] may use cameras or other devices for patient monitoring.     I am aware that the practice of medicine is not an exact science, and I acknowledge that no guarantees have been made to me as to the outcome of any tests, procedures or treatment.     Authorization for Release of Information: I understand that my insurance company and/or their agents may need information necessary to make determinations about payment/reimbursement. I hereby provide authorization to release to all insurance companies, their successors, assignees, other parties with whom they may have contracted, or others acting on their behalf, that are involved with payment for any hospital and/or clinic charges incurred by the patient, any information that they request and deem necessary for payment/reimbursement, and/or quality review.  I further authorize the release of my health information to physicians or other health care practitioners on staff who are involved in my health care now and in the future, and to other health care providers, entities, or institutions for the purpose of my continued care and treatment, including referrals.     REGISTRATION AUTHORIZATION  Form No. 20253  (Rev. 3/25/2024)    Page 1 of 3                       Medicare Patient's Certification and Authorization to Release Information and Payment Request:  I certify that the information given by me in applying for payment under Title XVIII of the Social Security Act is correct. I authorize any estrada of medical or other information about me to release to the Social SecurityAdministration, or its intermediaries or carriers, any information needed for this or a related Medicare claim. I request that payment of authorized benefits be made on my behalf.     Assignment of Insurance Benefits:   I hereby authorize any and all insurance companies, health plans, defined   benefit plans, health insurers or any entity that is or may be responsible for payment of my medical expenses to pay all hospital and medical benefits now due, and to become due and payable to me under any hospital benefits, sick benefits, injury benefits or any other benefit for services rendered to me, including Major Medical Benefits, direct to [facility] and all independently contracted physicians. I assign any and all rights that I may have against any and all insurance companies, health plans, defined benefit plans, health insurers or any entity that is or may be responsible for payment of my medical expenses, including, but not limited to any right to appeal a denial of a claim, any right to bring any action, lawsuit, administrative proceeding, or other cause of action on my behalf. I specifically assign my right to pursue litigation against any and all insurance companies, health plans, defined benefit plans, health insurers or any entity that is or may be responsible for payment of my medical expenses based upon a refusal to pay charges.            E. Valuables: It is understood and agreed that [facility] is not liable for the damage to or loss of any money, jewelry,   documents, dentures, eye glasses, hearing aids, prosthetics, or other property of value.      F. Computer Equipment: I understand and agree that should I choose to use computer equipment owned by [facility] or if I choose to access the Internet via [facility]s network, I do so at my own risk. [facility] is not responsible for any damage to my computer equipment or to any damages of any type that might arise from my loss of equipment or data.     G. Acceptance of Financial Responsibility:  I agree that in consideration of the services and   supplies that have been   or will be furnished to the patient, I am hereby obligated to pay all charges made for or on the account of the patient according to the standard rates (in effect at the time the services and supplies are delivered) established by [facility], including its Patient Financial Assistance Policy to the extent it is applicable. I understand that I am responsible for all charges, or portions thereof, not covered by insurance or other sources. Patient refunds will be distributed only after balances at all [facility] facilities are paid.     H. Communication Authorization:  I hereby authorize [facility] and its representatives, along with any billing service   or  who may work on their behalf, to contact me on   my cell phone and/or home phone using pre- recorded messages, artificial voice messages, automatic telephone dialing devices or other computer assisted technology, or by electronic      mail, text messaging, or by any other form of electronic communication. This includes, but is not limited to, appointment reminders, yearly physical exam reminders, preventive care reminders, patient campaigns, welcome calls, and calls about account balances on my account or any account on which I am listed as a guarantor. I understand I have the right to opt out of these communications at any time.      Relationship  Between  Facility and  Provider:      I understand that some, but not all, providers furnishing services to the patient are not employees or agents  of [facility]. The patient is under the care and supervision of his/her attending physician, and it is the responsibility of the facility and its nursing staff to carry out the instructions of such physicians. It is the responsibility of the patient's physician/designee to obtain the patient's informed consent, when required, for medical or surgical treatment, special diagnostic or therapeutic procedures, or hospital services rendered for the patient under the special instructions of the physician/designee.           REGISTRATION AUTHORIZATION  Form No. 92440 (Rev. 3/25/2024)    Page 2 of 3                       Immunizations: [facility] shares immunization information with state sponsored health departments to help you and your doctor keep track of your immunization records. By signing, you consent to have this information shared with the health department in your state:                                [place] - LINKS ([place] Immunization Network for Kids Statewide)                                [place] - MIIX ([place] Immunization Information eXchange)                                [place] - ImmPRINT (Immunization Patient Registry with Integrated Technology)     TERM: This authorization is valid for this and subsequent care/treatment I receive at [facility] and will remain valid unless/until revoked in writing by me.     [facility]: As used in this document, [facility] means all [facility] owned and managed facilities, including, but not limited to, all health centers, surgery centers, clinics, urgent care centers, and hospitals.         [facility] complies with applicable Federal civil rights laws and does not discriminate on the basis of race, color, national origin, age, disability, or sex.  ATENCIÓN: si habla bienvenido, tiene a an disposición servicios gratuitos de asistencia lingüística. Mu sullivan [phone].  [facility] Ý: N?u b?n nói Ti?ng Vi?t, có các d?ch v? h? tr? ngôn ng? mi?n phí  dành cho b?n. G?i s? [phone].        REGISTRATION AUTHORIZATION  Form No. 80220 (Rev. 3/25/2024)   Page 3 of 3

## 2024-08-22 NOTE — PROGRESS NOTES
Population Health Chart Review & Patient Outreach Details    Updates Requested / Reviewed:  [x]  Care Team Updated      Health Maintenance Topics Addressed and Outreach Outcomes / Actions Taken:    Breast Cancer Screening [x] MARYELLEN faxed to [facility]                DEXA [x] MARYELLEN faxed to [facility]

## 2024-08-23 ENCOUNTER — PATIENT OUTREACH (OUTPATIENT)
Facility: HOSPITAL | Age: 69
End: 2024-08-23
Payer: MEDICARE

## 2024-08-23 NOTE — PROGRESS NOTES
Population Health Chart Review & Patient Outreach Details    Updates Requested / Reviewed:  [x]  Care Team Updated      Health Maintenance Topics Addressed and Outreach Outcomes / Actions Taken:  Breast Cancer Screening [x]  updated with mammo on 7/31/24. Results sent to PCP for review.                DEXA [x]  updated with DEXA on 7/31/24. Results sent to PCP for review.

## 2024-08-28 ENCOUNTER — HOSPITAL ENCOUNTER (OUTPATIENT)
Dept: RADIOLOGY | Facility: HOSPITAL | Age: 69
Discharge: HOME | End: 2024-08-28
Attending: FAMILY MEDICINE
Payer: MEDICARE

## 2024-08-28 ENCOUNTER — TELEPHONE (OUTPATIENT)
Dept: FAMILY MEDICINE | Facility: CLINIC | Age: 69
End: 2024-08-28
Payer: MEDICARE

## 2024-08-28 DIAGNOSIS — R42 DIZZINESS: ICD-10-CM

## 2024-08-28 DIAGNOSIS — R55 SYNCOPE, UNSPECIFIED SYNCOPE TYPE: ICD-10-CM

## 2024-08-28 PROCEDURE — 93880 EXTRACRANIAL BILAT STUDY: CPT | Mod: TC

## 2024-08-28 NOTE — TELEPHONE ENCOUNTER
Contacted pt in regards to US. Informed pt of US results. Pt states she does want to follow with PT. Pt also states she has an ENT appt soon and she will hold off on PT referral/appt for now, just incase her otolaryngologist refers her to PT.      ----- Message from Jordyn Jay MD sent at 8/28/2024  9:53 AM CDT -----  Please call patient regarding US results. Carotid dopplers are normal. Recommend referral to PT for vertigo and neck pain. She has a specific therapy group she would like to use. Please ask her about this and order/schedule since Og is out of the office currently. She needs to get started as soon as possible. Thanks!

## 2024-10-24 RX ORDER — ESCITALOPRAM OXALATE 20 MG/1
20 TABLET ORAL EVERY MORNING
Qty: 90 TABLET | Refills: 1 | Status: SHIPPED | OUTPATIENT
Start: 2024-10-24

## 2024-12-03 ENCOUNTER — OFFICE VISIT (OUTPATIENT)
Dept: FAMILY MEDICINE | Facility: CLINIC | Age: 69
End: 2024-12-03
Payer: MEDICARE

## 2024-12-03 VITALS
TEMPERATURE: 97 F | HEIGHT: 66 IN | DIASTOLIC BLOOD PRESSURE: 75 MMHG | WEIGHT: 169.63 LBS | HEART RATE: 74 BPM | RESPIRATION RATE: 14 BRPM | BODY MASS INDEX: 27.26 KG/M2 | OXYGEN SATURATION: 97 % | SYSTOLIC BLOOD PRESSURE: 123 MMHG

## 2024-12-03 DIAGNOSIS — Z00.00 ENCOUNTER FOR PREVENTIVE HEALTH EXAMINATION: ICD-10-CM

## 2024-12-03 DIAGNOSIS — Z00.00 ENCOUNTER FOR SUBSEQUENT ANNUAL WELLNESS VISIT (AWV) IN MEDICARE PATIENT: Primary | ICD-10-CM

## 2024-12-03 DIAGNOSIS — Z23 NEEDS FLU SHOT: ICD-10-CM

## 2024-12-03 PROCEDURE — 90653 IIV ADJUVANT VACCINE IM: CPT | Mod: ,,, | Performed by: FAMILY MEDICINE

## 2024-12-03 PROCEDURE — G0008 ADMIN INFLUENZA VIRUS VAC: HCPCS | Mod: ,,, | Performed by: FAMILY MEDICINE

## 2024-12-03 PROCEDURE — G0439 PPPS, SUBSEQ VISIT: HCPCS | Mod: ,,, | Performed by: FAMILY MEDICINE

## 2024-12-03 RX ORDER — BUSPIRONE HYDROCHLORIDE 5 MG/1
5 TABLET ORAL 2 TIMES DAILY
COMMUNITY
Start: 2024-10-17

## 2024-12-03 RX ORDER — BUSPIRONE HYDROCHLORIDE 5 MG/1
5 TABLET ORAL 2 TIMES DAILY
COMMUNITY
Start: 2024-08-01

## 2024-12-03 RX ORDER — IBUPROFEN 800 MG/1
800 TABLET ORAL EVERY 8 HOURS
COMMUNITY
Start: 2024-10-08

## 2024-12-03 NOTE — PROGRESS NOTES
"  Pau Odell presented for a  Medicare AWV and comprehensive Health Risk Assessment today. The following components were reviewed and updated:    Medical history  Family History  Social history  Allergies and Current Medications  Health Risk Assessment  Health Maintenance  Care Team     70 yo WF presents to clinic today with CC of Medicare AWV.  Patient has a PMH significant for anxiety, GERD, OA, and sarcoidosis. She also has a h/o vertigo. She has seen ENT. Reports this is much improved.   Reports chronic issues are well controlled on current medication regimen.   Denies problems or side effects with medications.  Patient is, otherwise, without complaints.     ** See Completed Assessments for Annual Wellness Visit within the encounter summary.**         The following assessments were completed:  Living Situation  CAGE  Depression Screening  Timed Get Up and Go  Whisper Test  Cognitive Function Screening  Nutrition Screening  ADL Screening  PAQ Screening      Opioid documentation:      Patient does not have a current opioid prescription.        Vitals:    12/03/24 1028   BP: 123/75   BP Location: Left arm   Patient Position: Sitting   Pulse: 74   Resp: 14   Temp: 97.3 °F (36.3 °C)   TempSrc: Oral   SpO2: 97%   Weight: 76.9 kg (169 lb 9.6 oz)   Height: 5' 6" (1.676 m)     Body mass index is 27.37 kg/m².  Physical Exam  Constitutional:       General: She is not in acute distress.     Appearance: Normal appearance.   HENT:      Nose: Nose normal.      Mouth/Throat:      Mouth: Mucous membranes are moist.      Pharynx: Oropharynx is clear.   Eyes:      Conjunctiva/sclera: Conjunctivae normal.   Cardiovascular:      Rate and Rhythm: Normal rate and regular rhythm.      Heart sounds: Normal heart sounds. No murmur heard.  Pulmonary:      Effort: Pulmonary effort is normal. No respiratory distress.      Breath sounds: Normal breath sounds. No wheezing, rhonchi or rales.   Abdominal:      General: Bowel sounds are " normal.      Palpations: Abdomen is soft.      Tenderness: There is no abdominal tenderness.   Musculoskeletal:      Cervical back: Neck supple.      Right lower leg: No edema.      Left lower leg: No edema.   Skin:     Findings: No rash.   Neurological:      General: No focal deficit present.      Mental Status: She is alert. Mental status is at baseline.   Psychiatric:         Mood and Affect: Mood normal.               Diagnoses and health risks identified today and associated recommendations/orders:    1. Encounter for subsequent annual wellness visit (AWV) in Medicare patient    2. Encounter for preventive health examination    3. BMI 27.0-27.9,adult  - BMI discussed with patient.  - Diet and exercise  - Diet discussed and educational information provided  - Recommend 30 minutes of exercise at least 5 days per week.    4. Needs flu shot  - influenza (adjuvanted) (Fluad) 45 mcg/0.5 mL IM vaccine (> or = 64 yo) 0.5 mL    Provided Pau with a 5-10 year written screening schedule and personal prevention plan. Recommendations were developed using the USPSTF age appropriate recommendations. Education, counseling, and referrals were provided as needed. After Visit Summary printed and given to patient which includes a list of additional screenings\tests needed.    Referral pharmacy for shingle vaccine  Declines covid booster and tetanus vaccine  Declines hepatitis c screening     Follow up in about 1 year (around 12/4/2025) for in 1 year for annual wellness visit at 10:00 a.m. .    Jordyn Jay MD      I offered to discuss advanced care planning, including how to pick a person who would make decisions for you if you were unable to make them for yourself, called a health care power of , and what kind of decisions you might make such as use of life sustaining treatments such as ventilators and tube feeding when faced with a life limiting illness recorded on a living will that they will need to  know. (How you want to be cared for as you near the end of your natural life)     X Patient is interested in learning more about how to make advanced directives.  I provided them paperwork and offered to discuss this with them.

## 2024-12-03 NOTE — PATIENT INSTRUCTIONS
Counseling and Referral of Other Preventative  (Italic type indicates deductible and co-insurance are waived)    Patient Name: Pau Odell  Today's Date: 12/3/2024    Health Maintenance       Date Due Completion Date    Hepatitis C Screening Never done ---    TETANUS VACCINE Never done ---    Shingles Vaccine (1 of 2) Never done ---    Influenza Vaccine (1) 09/01/2024 10/11/2023    COVID-19 Vaccine (4 - 2024-25 season) 09/01/2024 12/1/2021    Hemoglobin A1c (Prediabetes) 02/07/2025 2/7/2024    Mammogram 07/31/2025 7/31/2024    Colorectal Cancer Screening 05/24/2026 5/24/2021    DEXA Scan 07/31/2026 7/31/2024    RSV Vaccine (Age 60+ and Pregnant patients) (1 - 1-dose 75+ series) 06/14/2028 ---    Lipid Panel 10/11/2028 10/11/2023        No orders of the defined types were placed in this encounter.    The following information is provided to all patients.  This information is to help you find resources for any of the problems found today that may be affecting your health:                  Living healthy guide: ms.gov    Understanding Diabetes: www.diabetes.org      Eating healthy: www.cdc.gov/healthyweight      CDC home safety checklist: www.cdc.gov/steadi/patient.html      Agency on Aging: ms.gov    Alcoholics anonymous (AA): www.aa.org      Physical Activity: www.brian.nih.gov/kw3znkk      Tobacco use: ms.gov

## 2025-05-07 ENCOUNTER — HOSPITAL ENCOUNTER (EMERGENCY)
Facility: HOSPITAL | Age: 70
Discharge: HOME | End: 2025-05-07
Payer: MEDICARE

## 2025-05-07 VITALS
TEMPERATURE: 98 F | WEIGHT: 180 LBS | HEIGHT: 66 IN | OXYGEN SATURATION: 97 % | BODY MASS INDEX: 28.93 KG/M2 | RESPIRATION RATE: 17 BRPM | DIASTOLIC BLOOD PRESSURE: 69 MMHG | HEART RATE: 80 BPM | SYSTOLIC BLOOD PRESSURE: 136 MMHG

## 2025-05-07 DIAGNOSIS — S69.91XA RIGHT WRIST INJURY, INITIAL ENCOUNTER: Primary | ICD-10-CM

## 2025-05-07 PROCEDURE — 99283 EMERGENCY DEPT VISIT LOW MDM: CPT | Mod: 25

## 2025-05-07 PROCEDURE — 99283 EMERGENCY DEPT VISIT LOW MDM: CPT | Mod: GF | Performed by: NURSE PRACTITIONER

## 2025-05-07 PROCEDURE — 25000003 PHARM REV CODE 250: Performed by: NURSE PRACTITIONER

## 2025-05-07 RX ORDER — ACETAMINOPHEN 500 MG
1000 TABLET ORAL
Status: COMPLETED | OUTPATIENT
Start: 2025-05-07 | End: 2025-05-07

## 2025-05-07 RX ADMIN — ACETAMINOPHEN 1000 MG: 500 TABLET ORAL at 08:05

## 2025-05-08 ENCOUNTER — TELEPHONE (OUTPATIENT)
Dept: EMERGENCY MEDICINE | Facility: HOSPITAL | Age: 70
End: 2025-05-08
Payer: MEDICARE

## 2025-05-08 NOTE — ED PROVIDER NOTES
Encounter Date: 5/7/2025       History     Chief Complaint   Patient presents with    Wrist Injury    Wrist Pain     Patient presents with a right wrist injury that occurred about 3 hours prior to arrival.  Reports she was chasing her dog out of the house when she fell, landing onto an outstretched hand on the right side.  No other injury sustained.  Minimal pain initially, but reports pain has increased over the past few hours.  No obvious deformity.  Has diffuse tenderness about the wrist, but also has snuffbox point tenderness.      Review of patient's allergies indicates:  No Known Allergies  Past Medical History:   Diagnosis Date    Osteopenia 07/31/2024    See DEXA report on 7/31/2024    Sarcoidosis, unspecified     in remission     Past Surgical History:   Procedure Laterality Date    APPENDECTOMY      CHOLECYSTECTOMY      ESOPHAGUS SURGERY      esophageal wrap    HERNIA REPAIR      right total knee Right     TOTAL ABDOMINAL HYSTERECTOMY W/ BILATERAL SALPINGOOPHORECTOMY       Family History   Problem Relation Name Age of Onset    Stroke Mother      Hypertension Mother       Social History[1]  Review of Systems   Respiratory: Negative.     Cardiovascular: Negative.    Musculoskeletal:  Positive for arthralgias.   Neurological: Negative.        Physical Exam     Initial Vitals [05/07/25 2029]   BP Pulse Resp Temp SpO2   (!) 169/97 86 17 98.2 °F (36.8 °C) 98 %      MAP       --         Physical Exam    Nursing note and vitals reviewed.  Constitutional: No distress.   HENT:   Head: Normocephalic and atraumatic.   Eyes: EOM are normal.   Neck: Neck supple.   Cardiovascular:  Normal rate.           Pulmonary/Chest: No respiratory distress.   Musculoskeletal:         General: Normal range of motion.      Cervical back: Neck supple.      Comments: No obvious deformity, edema, or ecchymosis.  Diffuse tenderness, but also has snuffbox tenderness.  3+ radial pulse, cap refill brisk, sensorium intact distally.      Neurological: She is alert. GCS score is 15. GCS eye subscore is 4. GCS verbal subscore is 5. GCS motor subscore is 6.   Skin: Skin is warm and dry. Capillary refill takes less than 2 seconds.         Medical Screening Exam   See Full Note    ED Course   Procedures  Labs Reviewed - No data to display       Imaging Results              X-Ray Wrist Complete Right (Final result)  Result time 05/07/25 21:30:02      Final result by Greg Wilson MD (05/07/25 21:30:02)                   Impression:      As above.      Electronically signed by: Greg Wilson MD  Date:    05/07/2025  Time:    21:30               Narrative:    EXAMINATION:  XR WRIST COMPLETE 3 VIEWS RIGHT    CLINICAL HISTORY:  Unspecified injury of right wrist, hand and finger(s), initial encounter    TECHNIQUE:  PA, lateral, and oblique views of the right wrist were performed.    COMPARISON:  08/24/2020.    FINDINGS:  The bone mineralization is within normal limits.  There is chronic injury of the base of the thumb.    There is joint space narrowing.  The soft tissue swelling about the right wrist.  No radiopaque foreign body is identified.    There is no evidence of an acute fracture or dislocation.                                       Medications   acetaminophen tablet 1,000 mg (1,000 mg Oral Given 5/7/25 2047)     Medical Decision Making  Patient presented with a right wrist injury.  Initial differential included a cuboid fracture with snuffbox tenderness.  No fracture or dislocation appreciated on x-ray.  Pain significantly improved with Tylenol, elevation, and ice.  Ace applied with instruction not over tightened.  Advised to take Tylenol alternated with Aleve or ibuprofen.  Also advised to follow up with primary care/Orthopedics if worsening, not improving, or concerns otherwise.    Amount and/or Complexity of Data Reviewed  Radiology: ordered.    Risk  OTC drugs.                                      Clinical Impression:   Final  diagnoses:  [S69.91XA] Right wrist injury, initial encounter (Primary)        ED Disposition Condition    Discharge Stable          ED Prescriptions    None       Follow-up Information       Follow up With Specialties Details Why Contact Info    Cali Pascal III, MD Orthopedic Surgery  As needed, If symptoms worsen [address] [place] [id number]  [phone]                   [1]   Social History  Tobacco Use    Smoking status: Former     Current packs/day: 0.00     Average packs/day: 0.1 packs/day for 1 year (0.1 ttl pk-yrs)     Types: Cigarettes     Start date:      Quit date:      Years since quittin.3     Passive exposure: Past    Smokeless tobacco: Never    Tobacco comments:     Smoked 2-3 cigarettes per day   Substance Use Topics    Alcohol use: Not Currently    Drug use: Not Currently        Reginaldo Mercedes, [facility]  [phone]

## 2025-05-08 NOTE — ED TRIAGE NOTES
Pt presents to ED with c/o right wrist injury and pain. Pt states she fell on her wrist while chasing after her dog that got out this afternoon around [id number]. Has not had any tylenol or ibuprofen. Has not tried ice. Ice pack applied during triage.

## 2025-05-08 NOTE — DISCHARGE INSTRUCTIONS
Follow up with your primary care provider/ orthopedics if worsening or not improving. Return to the ER as needed.

## 2025-06-03 DIAGNOSIS — R19.7 DIARRHEA, UNSPECIFIED TYPE: ICD-10-CM

## 2025-06-03 RX ORDER — ESCITALOPRAM OXALATE 20 MG/1
20 TABLET ORAL EVERY MORNING
Qty: 90 TABLET | Refills: 1 | Status: SHIPPED | OUTPATIENT
Start: 2025-06-03

## 2025-06-03 RX ORDER — CHOLESTYRAMINE 4 G/9G
4 POWDER, FOR SUSPENSION ORAL DAILY PRN
Qty: 60 PACKET | Refills: 1 | Status: SHIPPED | OUTPATIENT
Start: 2025-06-03 | End: 2026-06-03